# Patient Record
Sex: FEMALE | Race: BLACK OR AFRICAN AMERICAN | NOT HISPANIC OR LATINO | Employment: OTHER | URBAN - METROPOLITAN AREA
[De-identification: names, ages, dates, MRNs, and addresses within clinical notes are randomized per-mention and may not be internally consistent; named-entity substitution may affect disease eponyms.]

---

## 2021-08-02 DIAGNOSIS — E21.0 PRIMARY HYPERPARATHYROIDISM: Primary | ICD-10-CM

## 2021-08-10 ENCOUNTER — HOSPITAL ENCOUNTER (OUTPATIENT)
Dept: RADIOLOGY | Facility: HOSPITAL | Age: 76
Discharge: HOME | End: 2021-08-10
Attending: INTERNAL MEDICINE
Payer: MEDICARE

## 2021-08-10 DIAGNOSIS — E21.0 PRIMARY HYPERPARATHYROIDISM: Primary | ICD-10-CM

## 2021-08-10 DIAGNOSIS — E21.0 PRIMARY HYPERPARATHYROIDISM: ICD-10-CM

## 2021-08-10 PROCEDURE — 77080 DXA BONE DENSITY AXIAL: CPT | Mod: TC,PO

## 2021-08-12 ENCOUNTER — LAB VISIT (OUTPATIENT)
Dept: LAB | Facility: HOSPITAL | Age: 76
End: 2021-08-12
Attending: INTERNAL MEDICINE
Payer: MEDICARE

## 2021-08-12 DIAGNOSIS — E21.0 PRIMARY HYPERPARATHYROIDISM: ICD-10-CM

## 2021-08-12 LAB
CALCIUM 24H UR-MRATE: 8 MG/HR (ref 4–12)
CALCIUM UR-MCNC: 12.4 MG/DL
CALCIUM URINE (MG/SPEC): 192 MG/SPEC
CREAT 24H UR-MRATE: 33.6 MG/HR (ref 40–75)
CREAT UR-MCNC: 52 MG/DL (ref 15–325)
CREATININE, URINE (MG/SPEC): 806 MG/SPEC
URINE COLLECTION DURATION: 24 HR
URINE COLLECTION DURATION: 24 HR
URINE VOLUME: 1550 ML
URINE VOLUME: 1550 ML

## 2021-08-12 PROCEDURE — 82570 ASSAY OF URINE CREATININE: CPT | Performed by: INTERNAL MEDICINE

## 2021-08-12 PROCEDURE — 82340 ASSAY OF CALCIUM IN URINE: CPT | Performed by: INTERNAL MEDICINE

## 2021-10-08 ENCOUNTER — LAB VISIT (OUTPATIENT)
Dept: LAB | Facility: HOSPITAL | Age: 76
End: 2021-10-08
Attending: INTERNAL MEDICINE
Payer: MEDICARE

## 2021-10-08 DIAGNOSIS — E21.0 PRIMARY HYPERPARATHYROIDISM: ICD-10-CM

## 2021-10-08 DIAGNOSIS — E78.00 PURE HYPERCHOLESTEROLEMIA: ICD-10-CM

## 2021-10-08 DIAGNOSIS — Z79.899 ENCOUNTER FOR LONG-TERM (CURRENT) USE OF OTHER MEDICATIONS: Primary | ICD-10-CM

## 2021-10-08 DIAGNOSIS — E11.9 DIABETES MELLITUS WITHOUT COMPLICATION: ICD-10-CM

## 2021-10-08 DIAGNOSIS — E55.9 AVITAMINOSIS D: ICD-10-CM

## 2021-10-08 LAB
25(OH)D3+25(OH)D2 SERPL-MCNC: 30 NG/ML (ref 30–96)
ALBUMIN SERPL BCP-MCNC: 3.8 G/DL (ref 3.5–5.2)
ALP SERPL-CCNC: 94 U/L (ref 55–135)
ALT SERPL W/O P-5'-P-CCNC: 57 U/L (ref 10–44)
ANION GAP SERPL CALC-SCNC: 9 MMOL/L (ref 8–16)
ANION GAP SERPL CALC-SCNC: 9 MMOL/L (ref 8–16)
AST SERPL-CCNC: 30 U/L (ref 10–40)
BILIRUB SERPL-MCNC: 0.7 MG/DL (ref 0.1–1)
BUN SERPL-MCNC: 11 MG/DL (ref 8–23)
BUN SERPL-MCNC: 11 MG/DL (ref 8–23)
CALCIUM SERPL-MCNC: 10.2 MG/DL (ref 8.7–10.5)
CALCIUM SERPL-MCNC: 10.2 MG/DL (ref 8.7–10.5)
CHLORIDE SERPL-SCNC: 103 MMOL/L (ref 95–110)
CHLORIDE SERPL-SCNC: 103 MMOL/L (ref 95–110)
CHOLEST SERPL-MCNC: 93 MG/DL (ref 120–199)
CHOLEST/HDLC SERPL: 2.2 {RATIO} (ref 2–5)
CO2 SERPL-SCNC: 27 MMOL/L (ref 23–29)
CO2 SERPL-SCNC: 27 MMOL/L (ref 23–29)
CREAT SERPL-MCNC: 0.9 MG/DL (ref 0.5–1.4)
CREAT SERPL-MCNC: 0.9 MG/DL (ref 0.5–1.4)
EST. GFR  (AFRICAN AMERICAN): >60 ML/MIN/1.73 M^2
EST. GFR  (AFRICAN AMERICAN): >60 ML/MIN/1.73 M^2
EST. GFR  (NON AFRICAN AMERICAN): >60 ML/MIN/1.73 M^2
EST. GFR  (NON AFRICAN AMERICAN): >60 ML/MIN/1.73 M^2
ESTIMATED AVG GLUCOSE: 335 MG/DL (ref 68–131)
GLUCOSE SERPL-MCNC: 319 MG/DL (ref 70–110)
GLUCOSE SERPL-MCNC: 319 MG/DL (ref 70–110)
HBA1C MFR BLD: 13.3 % (ref 4.5–6.2)
HDLC SERPL-MCNC: 42 MG/DL (ref 40–75)
HDLC SERPL: 45.2 % (ref 20–50)
LDLC SERPL CALC-MCNC: 39.6 MG/DL (ref 63–159)
NONHDLC SERPL-MCNC: 51 MG/DL
POTASSIUM SERPL-SCNC: 3.6 MMOL/L (ref 3.5–5.1)
POTASSIUM SERPL-SCNC: 3.6 MMOL/L (ref 3.5–5.1)
PROT SERPL-MCNC: 6.1 G/DL (ref 6–8.4)
PTH-INTACT SERPL-MCNC: 99.5 PG/ML (ref 9–77)
SODIUM SERPL-SCNC: 139 MMOL/L (ref 136–145)
SODIUM SERPL-SCNC: 139 MMOL/L (ref 136–145)
T4 FREE SERPL-MCNC: 0.71 NG/DL (ref 0.71–1.51)
TRIGL SERPL-MCNC: 57 MG/DL (ref 30–150)
TSH SERPL DL<=0.005 MIU/L-ACNC: 26.72 UIU/ML (ref 0.34–5.6)

## 2021-10-08 PROCEDURE — 36415 COLL VENOUS BLD VENIPUNCTURE: CPT | Performed by: INTERNAL MEDICINE

## 2021-10-08 PROCEDURE — 80061 LIPID PANEL: CPT | Performed by: INTERNAL MEDICINE

## 2021-10-08 PROCEDURE — 84443 ASSAY THYROID STIM HORMONE: CPT | Performed by: INTERNAL MEDICINE

## 2021-10-08 PROCEDURE — 80053 COMPREHEN METABOLIC PANEL: CPT | Performed by: INTERNAL MEDICINE

## 2021-10-08 PROCEDURE — 83970 ASSAY OF PARATHORMONE: CPT | Performed by: INTERNAL MEDICINE

## 2021-10-08 PROCEDURE — 84439 ASSAY OF FREE THYROXINE: CPT | Performed by: INTERNAL MEDICINE

## 2021-10-08 PROCEDURE — 82306 VITAMIN D 25 HYDROXY: CPT | Performed by: INTERNAL MEDICINE

## 2021-10-08 PROCEDURE — 83036 HEMOGLOBIN GLYCOSYLATED A1C: CPT | Performed by: INTERNAL MEDICINE

## 2021-11-15 ENCOUNTER — HOSPITAL ENCOUNTER (OUTPATIENT)
Dept: RADIOLOGY | Facility: OTHER | Age: 76
Discharge: HOME | End: 2021-11-15
Attending: INTERNAL MEDICINE
Payer: MEDICARE

## 2021-11-15 DIAGNOSIS — R63.4 WEIGHT LOSS: ICD-10-CM

## 2021-11-15 PROCEDURE — 71046 X-RAY EXAM CHEST 2 VIEWS: CPT | Mod: TC,FY

## 2021-11-15 PROCEDURE — 76700 US ABDOMEN COMPLETE: ICD-10-PCS | Mod: 26,,, | Performed by: RADIOLOGY

## 2021-11-15 PROCEDURE — 76700 US EXAM ABDOM COMPLETE: CPT | Mod: 26,,, | Performed by: RADIOLOGY

## 2021-11-15 PROCEDURE — 71046 XR CHEST PA AND LATERAL: ICD-10-PCS | Mod: 26,,, | Performed by: RADIOLOGY

## 2021-11-15 PROCEDURE — 76700 US EXAM ABDOM COMPLETE: CPT | Mod: TC

## 2021-11-15 PROCEDURE — 71046 X-RAY EXAM CHEST 2 VIEWS: CPT | Mod: 26,,, | Performed by: RADIOLOGY

## 2022-01-07 ENCOUNTER — HOSPITAL ENCOUNTER (OUTPATIENT)
Facility: HOSPITAL | Age: 77
Discharge: HOME | End: 2022-01-09
Attending: EMERGENCY MEDICINE | Admitting: INTERNAL MEDICINE
Payer: MEDICARE

## 2022-01-07 DIAGNOSIS — K81.9 CHOLECYSTITIS: Primary | ICD-10-CM

## 2022-01-07 DIAGNOSIS — K80.20 CALCULUS OF GALLBLADDER WITHOUT CHOLECYSTITIS WITHOUT OBSTRUCTION: ICD-10-CM

## 2022-01-07 DIAGNOSIS — R07.9 CHEST PAIN: ICD-10-CM

## 2022-01-07 PROBLEM — E03.9 HYPOTHYROIDISM: Status: ACTIVE | Noted: 2022-01-07

## 2022-01-07 PROBLEM — C7A.8 PRIMARY MALIGNANT NEUROENDOCRINE NEOPLASM OF PANCREAS: Status: ACTIVE | Noted: 2021-12-20

## 2022-01-07 PROBLEM — E11.9 DIABETES MELLITUS: Status: ACTIVE | Noted: 2022-01-07

## 2022-01-07 PROBLEM — I15.2 HYPERTENSION ASSOCIATED WITH DIABETES: Status: ACTIVE | Noted: 2022-01-07

## 2022-01-07 PROBLEM — E11.59 HYPERTENSION ASSOCIATED WITH DIABETES: Status: ACTIVE | Noted: 2022-01-07

## 2022-01-07 PROBLEM — I10 HYPERTENSION: Status: ACTIVE | Noted: 2022-01-07

## 2022-01-07 PROBLEM — Z79.4 TYPE 2 DIABETES MELLITUS, WITH LONG-TERM CURRENT USE OF INSULIN: Status: ACTIVE | Noted: 2022-01-07

## 2022-01-07 PROBLEM — E78.5 HYPERLIPIDEMIA: Status: ACTIVE | Noted: 2022-01-07

## 2022-01-07 LAB
ALBUMIN SERPL BCP-MCNC: 3.6 G/DL (ref 3.5–5.2)
ALP SERPL-CCNC: 75 U/L (ref 55–135)
ALT SERPL W/O P-5'-P-CCNC: 79 U/L (ref 10–44)
ANION GAP SERPL CALC-SCNC: 8 MMOL/L (ref 8–16)
AST SERPL-CCNC: 67 U/L (ref 10–40)
BASOPHILS # BLD AUTO: 0.03 K/UL (ref 0–0.2)
BASOPHILS NFR BLD: 0.2 % (ref 0–1.9)
BILIRUB SERPL-MCNC: 0.5 MG/DL (ref 0.1–1)
BILIRUB UR QL STRIP: NEGATIVE
BUN SERPL-MCNC: 13 MG/DL (ref 8–23)
CALCIUM SERPL-MCNC: 10.4 MG/DL (ref 8.7–10.5)
CHLORIDE SERPL-SCNC: 108 MMOL/L (ref 95–110)
CLARITY UR: CLEAR
CO2 SERPL-SCNC: 21 MMOL/L (ref 23–29)
COLOR UR: YELLOW
CREAT SERPL-MCNC: 1 MG/DL (ref 0.5–1.4)
DIFFERENTIAL METHOD: ABNORMAL
EOSINOPHIL # BLD AUTO: 0 K/UL (ref 0–0.5)
EOSINOPHIL NFR BLD: 0 % (ref 0–8)
ERYTHROCYTE [DISTWIDTH] IN BLOOD BY AUTOMATED COUNT: 15.6 % (ref 11.5–14.5)
EST. GFR  (AFRICAN AMERICAN): >60 ML/MIN/1.73 M^2
EST. GFR  (NON AFRICAN AMERICAN): 56 ML/MIN/1.73 M^2
GLUCOSE SERPL-MCNC: 297 MG/DL (ref 70–110)
GLUCOSE UR QL STRIP: NEGATIVE
HCT VFR BLD AUTO: 33 % (ref 37–48.5)
HGB BLD-MCNC: 10.3 G/DL (ref 12–16)
HGB UR QL STRIP: NEGATIVE
IMM GRANULOCYTES # BLD AUTO: 0.08 K/UL (ref 0–0.04)
IMM GRANULOCYTES NFR BLD AUTO: 0.5 % (ref 0–0.5)
INFLUENZA A, MOLECULAR: NEGATIVE
INFLUENZA B, MOLECULAR: NEGATIVE
KETONES UR QL STRIP: NEGATIVE
LEUKOCYTE ESTERASE UR QL STRIP: NEGATIVE
LIPASE SERPL-CCNC: 8 U/L (ref 4–60)
LYMPHOCYTES # BLD AUTO: 0.4 K/UL (ref 1–4.8)
LYMPHOCYTES NFR BLD: 2.6 % (ref 18–48)
MCH RBC QN AUTO: 26.5 PG (ref 27–31)
MCHC RBC AUTO-ENTMCNC: 31.2 G/DL (ref 32–36)
MCV RBC AUTO: 85 FL (ref 82–98)
MONOCYTES # BLD AUTO: 0.6 K/UL (ref 0.3–1)
MONOCYTES NFR BLD: 3.5 % (ref 4–15)
NEUTROPHILS # BLD AUTO: 15.8 K/UL (ref 1.8–7.7)
NEUTROPHILS NFR BLD: 93.2 % (ref 38–73)
NITRITE UR QL STRIP: NEGATIVE
NRBC BLD-RTO: 0 /100 WBC
PH UR STRIP: 6 [PH] (ref 5–8)
PLATELET # BLD AUTO: 327 K/UL (ref 150–450)
PMV BLD AUTO: 10 FL (ref 9.2–12.9)
POTASSIUM SERPL-SCNC: 3.9 MMOL/L (ref 3.5–5.1)
PROT SERPL-MCNC: 6.1 G/DL (ref 6–8.4)
PROT UR QL STRIP: NEGATIVE
RBC # BLD AUTO: 3.88 M/UL (ref 4–5.4)
SARS-COV-2 RDRP RESP QL NAA+PROBE: NEGATIVE
SODIUM SERPL-SCNC: 137 MMOL/L (ref 136–145)
SP GR UR STRIP: <=1.005 (ref 1–1.03)
SPECIMEN SOURCE: NORMAL
URN SPEC COLLECT METH UR: ABNORMAL
UROBILINOGEN UR STRIP-ACNC: NEGATIVE EU/DL
WBC # BLD AUTO: 16.9 K/UL (ref 3.9–12.7)

## 2022-01-07 PROCEDURE — 36415 COLL VENOUS BLD VENIPUNCTURE: CPT | Performed by: EMERGENCY MEDICINE

## 2022-01-07 PROCEDURE — 85025 COMPLETE CBC W/AUTO DIFF WBC: CPT | Performed by: EMERGENCY MEDICINE

## 2022-01-07 PROCEDURE — 63600175 PHARM REV CODE 636 W HCPCS: Performed by: EMERGENCY MEDICINE

## 2022-01-07 PROCEDURE — G0378 HOSPITAL OBSERVATION PER HR: HCPCS

## 2022-01-07 PROCEDURE — 25500020 PHARM REV CODE 255

## 2022-01-07 PROCEDURE — 99285 EMERGENCY DEPT VISIT HI MDM: CPT | Mod: 25,CS

## 2022-01-07 PROCEDURE — 96365 THER/PROPH/DIAG IV INF INIT: CPT

## 2022-01-07 PROCEDURE — 83690 ASSAY OF LIPASE: CPT | Performed by: EMERGENCY MEDICINE

## 2022-01-07 PROCEDURE — U0002 COVID-19 LAB TEST NON-CDC: HCPCS | Performed by: EMERGENCY MEDICINE

## 2022-01-07 PROCEDURE — 80053 COMPREHEN METABOLIC PANEL: CPT | Performed by: EMERGENCY MEDICINE

## 2022-01-07 PROCEDURE — 81003 URINALYSIS AUTO W/O SCOPE: CPT | Performed by: EMERGENCY MEDICINE

## 2022-01-07 PROCEDURE — 87502 INFLUENZA DNA AMP PROBE: CPT | Performed by: EMERGENCY MEDICINE

## 2022-01-07 RX ORDER — VITAMIN E 268 MG
400 CAPSULE ORAL DAILY
COMMUNITY

## 2022-01-07 RX ORDER — LISINOPRIL 10 MG/1
10 TABLET ORAL DAILY
COMMUNITY

## 2022-01-07 RX ORDER — ASPIRIN 81 MG/1
81 TABLET ORAL DAILY
COMMUNITY

## 2022-01-07 RX ORDER — INSULIN GLARGINE 300 U/ML
INJECTION, SOLUTION SUBCUTANEOUS
COMMUNITY
Start: 2021-12-21

## 2022-01-07 RX ORDER — METOPROLOL SUCCINATE 50 MG/1
50 TABLET, EXTENDED RELEASE ORAL DAILY
Status: ON HOLD | COMMUNITY
End: 2022-01-09 | Stop reason: SDUPTHER

## 2022-01-07 RX ORDER — PANTOPRAZOLE SODIUM 40 MG/1
40 TABLET, DELAYED RELEASE ORAL DAILY
COMMUNITY

## 2022-01-07 RX ORDER — LEVOTHYROXINE SODIUM 88 UG/1
88 TABLET ORAL
Status: ON HOLD | COMMUNITY
End: 2022-01-09 | Stop reason: SDUPTHER

## 2022-01-07 RX ORDER — ATORVASTATIN CALCIUM 20 MG/1
20 TABLET, FILM COATED ORAL DAILY
COMMUNITY

## 2022-01-07 RX ORDER — METFORMIN HYDROCHLORIDE 500 MG/1
500 TABLET ORAL 2 TIMES DAILY WITH MEALS
COMMUNITY

## 2022-01-07 RX ADMIN — PIPERACILLIN AND TAZOBACTAM 4.5 G: 4; .5 INJECTION, POWDER, LYOPHILIZED, FOR SOLUTION INTRAVENOUS; PARENTERAL at 11:01

## 2022-01-07 RX ADMIN — IOHEXOL 75 ML: 350 INJECTION, SOLUTION INTRAVENOUS at 09:01

## 2022-01-08 LAB
POCT GLUCOSE: 166 MG/DL (ref 70–110)
POCT GLUCOSE: 177 MG/DL (ref 70–110)
POCT GLUCOSE: 99 MG/DL (ref 70–110)

## 2022-01-08 PROCEDURE — 99203 PR OFFICE/OUTPT VISIT, NEW, LEVL III, 30-44 MIN: ICD-10-PCS | Mod: ,,, | Performed by: SURGERY

## 2022-01-08 PROCEDURE — G0378 HOSPITAL OBSERVATION PER HR: HCPCS

## 2022-01-08 PROCEDURE — 96366 THER/PROPH/DIAG IV INF ADDON: CPT

## 2022-01-08 PROCEDURE — 94761 N-INVAS EAR/PLS OXIMETRY MLT: CPT

## 2022-01-08 PROCEDURE — 25000003 PHARM REV CODE 250: Performed by: NURSE PRACTITIONER

## 2022-01-08 PROCEDURE — 63600175 PHARM REV CODE 636 W HCPCS: Performed by: NURSE PRACTITIONER

## 2022-01-08 PROCEDURE — 99203 OFFICE O/P NEW LOW 30 MIN: CPT | Mod: ,,, | Performed by: SURGERY

## 2022-01-08 RX ORDER — SODIUM,POTASSIUM PHOSPHATES 280-250MG
2 POWDER IN PACKET (EA) ORAL
Status: DISCONTINUED | OUTPATIENT
Start: 2022-01-08 | End: 2022-01-09 | Stop reason: HOSPADM

## 2022-01-08 RX ORDER — INSULIN ASPART 100 [IU]/ML
1-10 INJECTION, SOLUTION INTRAVENOUS; SUBCUTANEOUS
Status: DISCONTINUED | OUTPATIENT
Start: 2022-01-08 | End: 2022-01-09 | Stop reason: HOSPADM

## 2022-01-08 RX ORDER — LANOLIN ALCOHOL/MO/W.PET/CERES
800 CREAM (GRAM) TOPICAL
Status: DISCONTINUED | OUTPATIENT
Start: 2022-01-08 | End: 2022-01-09 | Stop reason: HOSPADM

## 2022-01-08 RX ORDER — SIMETHICONE 80 MG
1 TABLET,CHEWABLE ORAL 4 TIMES DAILY PRN
Status: DISCONTINUED | OUTPATIENT
Start: 2022-01-08 | End: 2022-01-09 | Stop reason: HOSPADM

## 2022-01-08 RX ORDER — PROCHLORPERAZINE EDISYLATE 5 MG/ML
5 INJECTION INTRAMUSCULAR; INTRAVENOUS EVERY 6 HOURS PRN
Status: DISCONTINUED | OUTPATIENT
Start: 2022-01-08 | End: 2022-01-09 | Stop reason: HOSPADM

## 2022-01-08 RX ORDER — AMOXICILLIN 250 MG
1 CAPSULE ORAL 2 TIMES DAILY
Status: DISCONTINUED | OUTPATIENT
Start: 2022-01-08 | End: 2022-01-09 | Stop reason: HOSPADM

## 2022-01-08 RX ORDER — IBUPROFEN 200 MG
24 TABLET ORAL
Status: DISCONTINUED | OUTPATIENT
Start: 2022-01-08 | End: 2022-01-09 | Stop reason: HOSPADM

## 2022-01-08 RX ORDER — LISINOPRIL 10 MG/1
10 TABLET ORAL DAILY
Status: DISCONTINUED | OUTPATIENT
Start: 2022-01-08 | End: 2022-01-09 | Stop reason: HOSPADM

## 2022-01-08 RX ORDER — TALC
6 POWDER (GRAM) TOPICAL NIGHTLY PRN
Status: DISCONTINUED | OUTPATIENT
Start: 2022-01-08 | End: 2022-01-09 | Stop reason: HOSPADM

## 2022-01-08 RX ORDER — MAG HYDROX/ALUMINUM HYD/SIMETH 200-200-20
30 SUSPENSION, ORAL (FINAL DOSE FORM) ORAL 4 TIMES DAILY PRN
Status: DISCONTINUED | OUTPATIENT
Start: 2022-01-08 | End: 2022-01-09 | Stop reason: HOSPADM

## 2022-01-08 RX ORDER — ATORVASTATIN CALCIUM 20 MG/1
20 TABLET, FILM COATED ORAL DAILY
Status: DISCONTINUED | OUTPATIENT
Start: 2022-01-08 | End: 2022-01-09 | Stop reason: HOSPADM

## 2022-01-08 RX ORDER — HYDROCODONE BITARTRATE AND ACETAMINOPHEN 10; 325 MG/1; MG/1
1 TABLET ORAL EVERY 6 HOURS PRN
Status: DISCONTINUED | OUTPATIENT
Start: 2022-01-08 | End: 2022-01-09 | Stop reason: HOSPADM

## 2022-01-08 RX ORDER — HYDROCODONE BITARTRATE AND ACETAMINOPHEN 5; 325 MG/1; MG/1
1 TABLET ORAL EVERY 6 HOURS PRN
Status: DISCONTINUED | OUTPATIENT
Start: 2022-01-08 | End: 2022-01-09 | Stop reason: HOSPADM

## 2022-01-08 RX ORDER — ACETAMINOPHEN 325 MG/1
650 TABLET ORAL EVERY 8 HOURS PRN
Status: DISCONTINUED | OUTPATIENT
Start: 2022-01-08 | End: 2022-01-09 | Stop reason: HOSPADM

## 2022-01-08 RX ORDER — IBUPROFEN 200 MG
16 TABLET ORAL
Status: DISCONTINUED | OUTPATIENT
Start: 2022-01-08 | End: 2022-01-09 | Stop reason: HOSPADM

## 2022-01-08 RX ORDER — ONDANSETRON 2 MG/ML
4 INJECTION INTRAMUSCULAR; INTRAVENOUS EVERY 6 HOURS PRN
Status: DISCONTINUED | OUTPATIENT
Start: 2022-01-08 | End: 2022-01-09 | Stop reason: HOSPADM

## 2022-01-08 RX ORDER — NALOXONE HCL 0.4 MG/ML
0.02 VIAL (ML) INJECTION
Status: DISCONTINUED | OUTPATIENT
Start: 2022-01-08 | End: 2022-01-09 | Stop reason: HOSPADM

## 2022-01-08 RX ORDER — SODIUM CHLORIDE 0.9 % (FLUSH) 0.9 %
10 SYRINGE (ML) INJECTION EVERY 8 HOURS PRN
Status: DISCONTINUED | OUTPATIENT
Start: 2022-01-08 | End: 2022-01-09 | Stop reason: HOSPADM

## 2022-01-08 RX ORDER — GLUCAGON 1 MG
1 KIT INJECTION
Status: DISCONTINUED | OUTPATIENT
Start: 2022-01-08 | End: 2022-01-09 | Stop reason: HOSPADM

## 2022-01-08 RX ORDER — METOPROLOL SUCCINATE 50 MG/1
50 TABLET, EXTENDED RELEASE ORAL DAILY
Status: DISCONTINUED | OUTPATIENT
Start: 2022-01-08 | End: 2022-01-09

## 2022-01-08 RX ADMIN — PIPERACILLIN AND TAZOBACTAM 4.5 G: 4; .5 INJECTION, POWDER, LYOPHILIZED, FOR SOLUTION INTRAVENOUS; PARENTERAL at 09:01

## 2022-01-08 RX ADMIN — HYDROCODONE BITARTRATE AND ACETAMINOPHEN 1 TABLET: 10; 325 TABLET ORAL at 12:01

## 2022-01-08 RX ADMIN — HYDROCODONE BITARTRATE AND ACETAMINOPHEN 1 TABLET: 10; 325 TABLET ORAL at 09:01

## 2022-01-08 RX ADMIN — MELATONIN TAB 3 MG 6 MG: 3 TAB at 12:01

## 2022-01-08 RX ADMIN — MELATONIN TAB 3 MG 6 MG: 3 TAB at 09:01

## 2022-01-08 RX ADMIN — METOPROLOL SUCCINATE 50 MG: 50 TABLET, EXTENDED RELEASE ORAL at 09:01

## 2022-01-08 RX ADMIN — LISINOPRIL 10 MG: 10 TABLET ORAL at 09:01

## 2022-01-08 NOTE — CONSULTS
[facility]  General Surgery  Consult Note    Patient Name: Asha Lam  MRN: [id number]  Code Status: Full Code  Admission Date: 1/7/2022  Hospital Length of Stay: 0 days  Attending Physician: Abimael Sanchez MD  Primary Care Provider: Ruben Brown MD    Patient information was obtained from patient and ER records.     Inpatient consult to General Surgery  Consult performed by: Fercho Guo MD  Consult ordered by: Britney Donis NP        Subjective:     Principal Problem: Cholelithiasis    History of Present Illness: 76-year-old female who presented to the ER yesterday evening with abdominal pain located in the right upper quadrant.  Initially per the ER physician pain was felt to be in the right upper quadrant radiating around to her right flank.  On CT scan she did have cholelithiasis which was also confirmed on ultrasound.  There was concern for cholecystitis.  Ultrasound did confirm cholelithiasis but no other findings consistent with cholecystitis.  Given concern for cholecystitis patient was admitted with initial plans for surgical intervention.  Patient notes this morning that she is having no pain.  Upon further discussion with the patient she notes she has been having chronic pain in the right upper quadrant for the last several months.  She notes this began several months ago and has been fairly constant.  She believes this is related to a neuroendocrine tumor in her pancreas.  This is currently being worked up at another facility.  Reportedly there are plans for surgical resection in the near future.  Today patient notes her pain is much improved and states that she would prefer to not have surgery at the present time.  Patient has no significant past surgical history.  Her past medical history significant for hypertension, hyperlipidemia and a neuroendocrine tumor of the pancreas.      No current facility-administered medications on file prior to encounter.     Current  Outpatient Medications on File Prior to Encounter   Medication Sig    aspirin (ECOTRIN) 81 MG EC tablet Take 81 mg by mouth once daily.    atorvastatin (LIPITOR) 20 MG tablet Take 20 mg by mouth once daily.    levothyroxine (SYNTHROID) 88 MCG tablet Take 88 mcg by mouth before breakfast.    lisinopriL 10 MG tablet Take 10 mg by mouth once daily.    metFORMIN (GLUCOPHAGE) 500 MG tablet Take 500 mg by mouth 2 (two) times daily with meals.    metoprolol succinate (TOPROL-XL) 50 MG 24 hr tablet Take 50 mg by mouth once daily.    pantoprazole (PROTONIX) 40 MG tablet Take 40 mg by mouth once daily.    TOUJEO SOLOSTAR U-300 INSULIN 300 unit/mL (1.5 mL) InPn pen SMARTSIG:10 Unit(s) SUB-Q Daily    vitamin E 400 UNIT capsule Take 400 Units by mouth once daily.    [DISCONTINUED] SENSIPAR 30 mg Tab TAKE 1 TABLET BY MOUTH ONCE DAILY AS DIRECTED       Review of patient's allergies indicates:  No Known Allergies    Past Medical History:   Diagnosis Date    Diabetes mellitus     Hypertension     Hyperthyroidism     Hypothyroid      Past Surgical History:   Procedure Laterality Date    ANKLE SURGERY Left      SECTION      HYSTERECTOMY       Family History     Family history is unknown by patient.        Tobacco Use    Smoking status: Never Smoker    Smokeless tobacco: Never Used   Substance and Sexual Activity    Alcohol use: Not Currently    Drug use: Never    Sexual activity: Not on file     Review of Systems   Constitutional: Negative for activity change and appetite change.   Gastrointestinal: Positive for abdominal pain and diarrhea.   Musculoskeletal: Negative for arthralgias and joint swelling.     Objective:     Vital Signs (Most Recent):  Temp: 99.9 °F (37.7 °C) (22 1602)  Pulse: 67 (22 1602)  Resp: 16 (22 1602)  BP: 135/60 (22 1602)  SpO2: 97 % (22 1602) Vital Signs (24h Range):  Temp:  [96.9 °F (36.1 °C)-99.9 °F (37.7 °C)] 99.9 °F (37.7 °C)  Pulse:  [57-94]  67  Resp:  [16-18] 16  SpO2:  [97 %-100 %] 97 %  BP: (118-146)/(56-70) 135/60     Weight: 59.5 kg (131 lb 2.8 oz)  Body mass index is 23.24 kg/m².    Physical Exam  Vitals reviewed.   Cardiovascular:      Rate and Rhythm: Normal rate.      Pulses: Normal pulses.      Heart sounds: No murmur heard.      Pulmonary:      Effort: Pulmonary effort is normal. No respiratory distress.   Abdominal:      General: Abdomen is flat. Bowel sounds are normal. There is no distension.      Tenderness: There is no abdominal tenderness.   Neurological:      Mental Status: She is alert.         Significant Labs:  I have reviewed all pertinent lab results within the past 24 hours.  CBC:   Recent Labs   Lab 01/07/22 2050   WBC 16.90*   RBC 3.88*   HGB 10.3*   HCT 33.0*      MCV 85   MCH 26.5*   MCHC 31.2*     BMP:   Recent Labs   Lab 01/07/22 2050   *      K 3.9      CO2 21*   BUN 13   CREATININE 1.0   CALCIUM 10.4     CMP:   Recent Labs   Lab 01/07/22 2050   *   CALCIUM 10.4   ALBUMIN 3.6   PROT 6.1      K 3.9   CO2 21*      BUN 13   CREATININE 1.0   ALKPHOS 75   ALT 79*   AST 67*   BILITOT 0.5       Significant Diagnostics:  CT and US reviewed.  Cholelithaisis noted.  Mass at head of pancreas    Assessment/Plan:     * Cholelithiasis  Discussed with patient and her daughter.  No acute findings consistent with acute cholecystitis.  For patient pain is been present for several months.  Agree with patient no indication for emergent surgical cholecystectomy.  Discussed with patient that the gallbladder would likely be removed anyway since the time of her surgery.  Patient expresses understanding.  Will advance her diet and surgery to sign off.      VTE Risk Mitigation (From admission, onward)         Ordered     Reason for No Pharmacological VTE Prophylaxis  Once        Question:  Reasons:  Answer:  Risk of Bleeding    01/08/22 0005     IP VTE HIGH RISK PATIENT  Once         01/08/22 0004      Place sequential compression device  Until discontinued         01/08/22 0004                Thank you for your consult. I will sign off. Please contact us if you have any additional questions.    Fercho Guo MD  General Surgery  [facility]

## 2022-01-08 NOTE — ASSESSMENT & PLAN NOTE
Recently diagnosed with a neuroendocrine neoplasm of pancreas & receives care at [facility].    Plan:  Continue to monitor for acute changes  PRN pain control

## 2022-01-08 NOTE — PROGRESS NOTES
[facility] Hospital Medicine  Progress Note    Patient Name: Asha Lam  MRN: [id number]  Patient Class: OP- Observation   Admission Date: 1/7/2022  Length of Stay: 0 days  Attending Physician: Abimael Sanchez MD  Primary Care Provider: Ruben Brown MD    Subjective:     Principal Problem:Cholelithiasis    HPI:  Asha Lam is a 77 y/o female with PMHx of HTN, DM type II, Hypothyroidism,  and hyperlipidemia who presents with diarrhea and RUQ pain since this morning. Patient has had one episode of diarrhea this morning. Patient recently diagnosed with a neuroendocrine neoplasm of pancreas and is seen at [facility]. Patient was brought to ED by her daughter for further evaluation of RUQ pain. Daughter reports low grade fever to 99.8F that resolved with Tylenol.  Abdominal CT showed cholelithiasis. Question is made of strandy changes in the pericholecystic fat raising question of cholecystitis and Abdominal US cholelithiasis. No convincing evidence of cholecystitis. Stable left lobe of the liver lesion as compared to prior ultrasound and CT suspicious for neoplasm. Lab work revealed leukocytosis to 16.9.  ED provider spoke to Dr. Guo and patient started on IV Zosyn. Hospital medicine consulted and patient was seen in ED with her daughter, Felicity, at bedside.  Patient complaining of RUQ pain that increases with inspiration. Patient will be admitted to hospital medicine for further evaluation of her cholelithiasis.       Overview/Hospital Course:  No notes on file    Interval History: Patient with no acute events overnight. Continues to decline surgery if needed per nursing notes. Currently on liquid diet. Surgery to evaluate later today.     Review of Systems   Constitutional: Positive for appetite change. Negative for activity change, chills and diaphoresis.   HENT: Negative for congestion, dental problem and drooling.    Eyes: Negative for pain, discharge and itching.   Respiratory:  Negative for apnea, choking and chest tightness.    Cardiovascular: Negative for chest pain, palpitations and leg swelling.   Gastrointestinal: Positive for abdominal pain and diarrhea. Negative for abdominal distention and blood in stool.   Endocrine: Negative for cold intolerance, heat intolerance, polydipsia and polyphagia.   Genitourinary: Negative for difficulty urinating, dyspareunia and dysuria.   Musculoskeletal: Negative for arthralgias, back pain and gait problem.   All other systems reviewed and are negative.    Objective:     Vital Signs (Most Recent):  Temp: 97 °F (36.1 °C) (01/08/22 0748)  Pulse: (!) 57 (01/08/22 0748)  Resp: 18 (01/08/22 0748)  BP: 128/70 (01/08/22 0748)  SpO2: 100 % (01/08/22 0748) Vital Signs (24h Range):  Temp:  [96.9 °F (36.1 °C)-97.9 °F (36.6 °C)] 97 °F (36.1 °C)  Pulse:  [57-94] 57  Resp:  [17-18] 18  SpO2:  [98 %-100 %] 100 %  BP: (118-146)/(56-70) 128/70     Weight: 59.5 kg (131 lb 2.8 oz)  Body mass index is 23.24 kg/m².    Intake/Output Summary (Last 24 hours) at 1/8/2022 1119  Last data filed at 1/8/2022 0030  Gross per 24 hour   Intake 100 ml   Output --   Net 100 ml      Physical Exam  Vitals and nursing note reviewed.   Constitutional:       Appearance: Normal appearance. She is normal weight.   HENT:      Head: Normocephalic and atraumatic.      Nose: Nose normal.      Mouth/Throat:      Mouth: Mucous membranes are moist.   Eyes:      Extraocular Movements: Extraocular movements intact.      Conjunctiva/sclera: Conjunctivae normal.      Pupils: Pupils are equal, round, and reactive to light.   Cardiovascular:      Rate and Rhythm: Normal rate and regular rhythm.      Pulses: Normal pulses.      Heart sounds: Normal heart sounds.   Pulmonary:      Effort: Pulmonary effort is normal.      Breath sounds: Normal breath sounds.   Abdominal:      General: Abdomen is flat. Bowel sounds are normal.      Palpations: Abdomen is soft.   Musculoskeletal:         General: Normal  range of motion.   Skin:     General: Skin is warm and dry.   Neurological:      General: No focal deficit present.      Mental Status: She is alert and oriented to person, place, and time. Mental status is at baseline.   Psychiatric:         Mood and Affect: Mood normal.         Behavior: Behavior normal.         Significant Labs: All pertinent labs within the past 24 hours have been reviewed.    Significant Imaging: I have reviewed all pertinent imaging results/findings within the past 24 hours.      Assessment/Plan:      * Cholelithiasis  Abdominal US- Cholelithiasis. No convincing evidence of cholecystitis. Stable left lobe of the liver lesion as compared to prior ultrasound and CT suspicious for neoplasm   CT-Cholelithiasis.  Question is made of strandy changes in the pericholecystic fat raising question of cholecystitis.    Plan:  General surgery consulted  Continue IV antibiotics  PRN pain control    Primary malignant neuroendocrine neoplasm of pancreas  Recently diagnosed with a neuroendocrine neoplasm of pancreas & receives care at [facility].    Plan:  Continue to monitor for acute changes  PRN pain control    Hypertension associated with diabetes  Chronic, controlled.  Latest blood pressure and vitals reviewed-   Temp:  [96.9 °F (36.1 °C)-97.9 °F (36.6 °C)]   Pulse:  [57-94]   Resp:  [17-18]   BP: (118-146)/(56-70)   SpO2:  [98 %-100 %] .   Home meds for hypertension were reviewed and noted below.   Hypertension Medications             lisinopriL 10 MG tablet Take 10 mg by mouth once daily.    metoprolol succinate (TOPROL-XL) 50 MG 24 hr tablet Take 50 mg by mouth once daily.          While in the hospital, will manage blood pressure as follows; Continue home antihypertensive regimen    Will utilize p.r.n. blood pressure medication only if patient's blood pressure greater than  180/110 and she develops symptoms such as worsening chest pain or shortness of breath.        Hyperlipidemia   Patient is  chronically on statin.will continue for now. Monitor clinically. Last LDL was   Lab Results   Component Value Date    LDLCALC 39.6 (L) 10/08/2021            Type 2 diabetes mellitus, with long-term current use of insulin  Patient's FSGs are uncontrolled due to hyperglycemia on current medication regimen.  Last A1c reviewed-   Lab Results   Component Value Date    HGBA1C 13.3 (H) 10/08/2021     Most recent fingerstick glucose reviewed-   Recent Labs   Lab 01/08/22  0623 01/08/22  1111   POCTGLUCOSE 166* 99     Current correctional scale  Medium  Maintain anti-hyperglycemic dose as follows-   Antihyperglycemics (From admission, onward)            Start     Stop Route Frequency Ordered    01/08/22 0100  insulin aspart U-100 pen 1-10 Units         -- SubQ Before meals & nightly PRN 01/08/22 0005        Hold Oral hypoglycemics while patient is in the hospital.        Hypothyroidism  Patient has chronic hypothyroidism. TFTs reviewed-   Lab Results   Component Value Date    TSH 26.720 (H) 10/08/2021   Will continue chronic levothyroxine and adjust for and clinical changes.  Repeat TSH ordered.       VTE Risk Mitigation (From admission, onward)         Ordered     Reason for No Pharmacological VTE Prophylaxis  Once        Question:  Reasons:  Answer:  Risk of Bleeding    01/08/22 0005     IP VTE HIGH RISK PATIENT  Once         01/08/22 0004     Place sequential compression device  Until discontinued         01/08/22 0004                Discharge Planning   LORRIE:      Code Status: Full Code   Is the patient medically ready for discharge?:     Reason for patient still in hospital (select all that apply): Patient trending condition           Abimael Sanchez MD  Department of Hospital Medicine   [facility]

## 2022-01-08 NOTE — ED NOTES
Patient resting in bed at this time. Warm blankets provided. Patient denies any other needs. vss/nadn

## 2022-01-08 NOTE — H&P
[facility] Emergency [place]  [facility] Medicine  History & Physical    Patient Name: Asha Lam  MRN: [id number]  Patient Class: OP- Observation  Admission Date: 2022  Attending Physician: Abimael Sanchez MD   Primary Care Provider: Ruben Brown MD         Patient information was obtained from patient, relative(s), past medical records and ER records.     Subjective:     Principal Problem:Cholelithiasis    Chief Complaint:   Chief Complaint   Patient presents with    Diarrhea     With chills and dizziness         HPI: Asha Lam is a 75 y/o female with PMHx of HTN, DM type II, Hypothyroidism,  and hyperlipidemia who presents with diarrhea and RUQ pain since this morning. Patient has had one episode of diarrhea this morning. Patient recently diagnosed with a neuroendocrine neoplasm of pancreas and is seen at [facility]. Patient was brought to ED by her daughter for further evaluation of RUQ pain. Daughter reports low grade fever to 99.8F that resolved with Tylenol.  Abdominal CT showed cholelithiasis. Question is made of strandy changes in the pericholecystic fat raising question of cholecystitis and Abdominal US cholelithiasis. No convincing evidence of cholecystitis. Stable left lobe of the liver lesion as compared to prior ultrasound and CT suspicious for neoplasm. Lab work revealed leukocytosis to 16.9.  ED provider spoke to Dr. Guo and patient started on IV Zosyn. Hospital medicine consulted and patient was seen in ED with her daughter, Felicity, at bedside.  Patient complaining of RUQ pain that increases with inspiration. Patient will be admitted to hospital medicine for further evaluation of her cholelithiasis.       Past Medical History:   Diagnosis Date    Diabetes mellitus     Hypertension     Hyperthyroidism     Hypothyroid        Past Surgical History:   Procedure Laterality Date    ANKLE SURGERY Left      SECTION      HYSTERECTOMY         Review of patient's allergies  indicates:  No Known Allergies    No current facility-administered medications on file prior to encounter.     Current Outpatient Medications on File Prior to Encounter   Medication Sig    aspirin (ECOTRIN) 81 MG EC tablet Take 81 mg by mouth once daily.    atorvastatin (LIPITOR) 20 MG tablet Take 20 mg by mouth once daily.    levothyroxine (SYNTHROID) 88 MCG tablet Take 88 mcg by mouth before breakfast.    lisinopriL 10 MG tablet Take 10 mg by mouth once daily.    metFORMIN (GLUCOPHAGE) 500 MG tablet Take 500 mg by mouth 2 (two) times daily with meals.    metoprolol succinate (TOPROL-XL) 50 MG 24 hr tablet Take 50 mg by mouth once daily.    pantoprazole (PROTONIX) 40 MG tablet Take 40 mg by mouth once daily.    TOUJEO SOLOSTAR U-300 INSULIN 300 unit/mL (1.5 mL) InPn pen SMARTSIG:10 Unit(s) SUB-Q Daily    vitamin E 400 UNIT capsule Take 400 Units by mouth once daily.    [DISCONTINUED] SENSIPAR 30 mg Tab TAKE 1 TABLET BY MOUTH ONCE DAILY AS DIRECTED     Family History    None       Tobacco Use    Smoking status: Never Smoker    Smokeless tobacco: Never Used   Substance and Sexual Activity    Alcohol use: Not on file    Drug use: Never    Sexual activity: Not on file     Review of Systems   Constitutional: Positive for appetite change, fever and unexpected weight change. Negative for activity change and chills.   HENT: Negative for congestion, sore throat and trouble swallowing.    Eyes: Negative for photophobia and visual disturbance.   Respiratory: Negative for cough, chest tightness and shortness of breath.    Cardiovascular: Negative for chest pain, palpitations and leg swelling.   Gastrointestinal: Positive for abdominal pain and diarrhea. Negative for nausea.   Genitourinary: Negative for dysuria, flank pain and hematuria.   Musculoskeletal: Negative for back pain.   Neurological: Negative for dizziness, weakness and headaches.   Psychiatric/Behavioral: Negative for confusion.     Objective:      Vital Signs (Most Recent):  Temp: 97.9 °F (36.6 °C) (01/07/22 1737)  Pulse: 76 (01/07/22 2302)  Resp: 18 (01/07/22 2302)  BP: (!) 146/65 (01/07/22 2302)  SpO2: 98 % (01/07/22 2302) Vital Signs (24h Range):  Temp:  [97.9 °F (36.6 °C)] 97.9 °F (36.6 °C)  Pulse:  [76-94] 76  Resp:  [18] 18  SpO2:  [98 %-100 %] 98 %  BP: (118-146)/(56-65) 146/65     Weight: 55.3 kg (122 lb)  Body mass index is 21.61 kg/m².    Physical Exam  Vitals reviewed.   Constitutional:       Appearance: Normal appearance. She is normal weight.   HENT:      Head: Normocephalic.      Mouth/Throat:      Mouth: Mucous membranes are moist.      Pharynx: Oropharynx is clear.   Eyes:      Pupils: Pupils are equal, round, and reactive to light.   Cardiovascular:      Rate and Rhythm: Normal rate and regular rhythm.      Pulses: Normal pulses.      Heart sounds: Normal heart sounds.   Pulmonary:      Effort: Pulmonary effort is normal.      Breath sounds: Normal breath sounds.   Abdominal:      General: Bowel sounds are normal.      Tenderness: There is abdominal tenderness in the right upper quadrant.   Musculoskeletal:         General: Normal range of motion.      Cervical back: Normal range of motion.   Skin:     General: Skin is warm and dry.   Neurological:      Mental Status: She is alert and oriented to person, place, and time. Mental status is at baseline.   Psychiatric:         Mood and Affect: Mood normal.           CRANIAL NERVES     CN III, IV, VI   Pupils are equal, round, and reactive to light.       Significant Labs:   All pertinent labs within the past 24 hours have been reviewed.  CBC:   Recent Labs   Lab 01/07/22 2050   WBC 16.90*   HGB 10.3*   HCT 33.0*        CMP:   Recent Labs   Lab 01/07/22 2050      K 3.9      CO2 21*   *   BUN 13   CREATININE 1.0   CALCIUM 10.4   PROT 6.1   ALBUMIN 3.6   BILITOT 0.5   ALKPHOS 75   AST 67*   ALT 79*   ANIONGAP 8   EGFRNONAA 56*       Significant Imaging: I have  reviewed all pertinent imaging results/findings within the past 24 hours.     Imaging Results          US Abdomen Limited (Final result)  Result time 01/07/22 23:01:49    Final result by Jacey Wilson MD (01/07/22 23:01:49)                 Impression:      Cholelithiasis.  No convincing evidence of cholecystitis.    Stable left lobe of the liver lesion as compared to prior ultrasound and CT suspicious for neoplasm.  The right lobe of the liver lesion described on CT is not demonstrated on this exam.    Nonvisualization of the pancreas secondary to bowel gas.      Electronically signed by: Jacey Wilson  Date:    01/07/2022  Time:    23:01             Narrative:    EXAMINATION:  US ABDOMEN LIMITED    CLINICAL HISTORY:  cholecystitis;    TECHNIQUE:  Limited ultrasound of the right upper quadrant of the abdomen (including pancreas, liver, gallbladder, common bile duct, and spleen) was performed.    COMPARISON:  CT abdomen pelvis also obtained same date 01/07/2022, right upper quadrant ultrasound 11/15/2021.    FINDINGS:  Liver: Normal in size, measuring 13.4 cm. There is a stable hypoechoic 2.5 x 1.5 x 2.5 cm lesion left lobe of the liver.  No other focal lesions are identified as imaged.    Gallbladder: There is cholelithiasis.  No gallbladder wall thickening or pericholecystic fluid to suggest cholecystitis.  There was a no sonographic Martino sign.    Pancreas is obscured by gas.    Biliary system: The common duct is not dilated, measuring 4 mm.  No intrahepatic ductal dilatation.    Spleen: Normal in size and echotexture, measuring 6.9 x 3.1 cm.    Miscellaneous: No upper abdominal ascites.                                CT Abdomen Pelvis With Contrast (Final result)  Result time 01/07/22 21:55:46    Final result by Jacey Wilson MD (01/07/22 21:55:46)                 Impression:      Cholelithiasis.  Question is made of strandy changes in the pericholecystic fat raising question of  cholecystitis.    Mass replacing the pancreatic head with atrophy of the pancreatic body and tail and dilatation of the common bile duct compatible neoplasm.    Two nonspecific lesions in the liver as described stable since prior  ultrasound 11/15/2021.  Correlate with workup for metastatic disease.  Minor prominence of the central intrahepatic biliary ducts.    Left renal cysts.    This report was flagged in Epic as abnormal.      Electronically signed by: Jacey Wilson  Date:    01/07/2022  Time:    21:55             Narrative:    EXAMINATION:  CT ABDOMEN PELVIS WITH CONTRAST    CLINICAL HISTORY:  Abdominal pain, acute, nonlocalized;Neuroendocrine tumor w abdominal pain;    TECHNIQUE:  Low dose axial images, sagittal and coronal reformations were obtained from the lung bases to the pubic symphysis before and following the IV administration of 75 of mL of Omnipaque 350 and the oral administration of 30 mL of Omnipaque 350..    COMPARISON:  None    FINDINGS:  Abdomen:    - Lung bases: There is a right-sided pleural effusion with borderline simple Hounsfield unit measurements.  There is compressive atelectasis of the right lung base otherwise lung bases are clear.  Heart is not significantly enlarged.    - Liver: There is hypoechoic mass measuring 2 cm in the left lobe.  There is also heterogeneous area with margins not as well-defined in the right lobe measuring 3 cm maximally corresponding to ultrasound findings.  Mild dilatation of the central hepatic biliary ducts..  There is no hepatomegaly    - Gallbladder: Note is made of gallstones in the gallbladder.  Subtle strandy changes around the gallbladder raise a question of inflammation.    -pancreas: There is a pancreatic head mass measuring 5.5 x 3 cm and there is atrophy of the pancreatic tail as well as dilatation of the distal pancreatic duct as also noted on ultrasound    - Spleen: Negative.    - Kidneys: Left renal cysts again noted.  No opaque calculi or  hydronephrosis.    - Adrenals: Unremarkable.    -Retroperitoneum:  No significant adenopathy.    - Vascular: No abdominal aortic aneurysm.    - Abdominal wall:  Unremarkable.    Pelvis:    No pelvic mass, adenopathy, or free fluid.  Bladder is unremarkable.    Bowel/Mesentery:    No evidence of bowel obstruction or inflammation.  Appendix appears normal.    Bones:  No acute osseous abnormality and no suspicious lytic or blastic lesion.                                  Assessment/Plan:     * Cholelithiasis  Abdominal US- Cholelithiasis. No convincing evidence of cholecystitis. Stable left lobe of the liver lesion as compared to prior ultrasound and CT suspicious for neoplasm    CT-Cholelithiasis.  Question is made of strandy changes in the pericholecystic fat raising question of cholecystitis.    Plan:  GI consult  IV antibiotics  PRN pain control  NPO-pending surgery consult        Hypothyroidism  Patient has chronic hypothyroidism. TFTs reviewed-   Lab Results   Component Value Date    TSH 26.720 (H) 10/08/2021   . Will continue chronic levothyroxine and adjust for and clinical changes.      Primary malignant neuroendocrine neoplasm of pancreas  Recently diagnosed with a neuroendocrine neoplasm of pancreas & receives care at [facility].    Plan:  Continue to monitor for acute changes  PRN pain control    Hypertension associated with diabetes  Chronic, controlled.  Latest blood pressure and vitals reviewed-   Temp:  [97.9 °F (36.6 °C)]   Pulse:  [76-94]   Resp:  [18]   BP: (118-146)/(56-65)   SpO2:  [98 %-100 %] .   Home meds for hypertension were reviewed and noted below.   Hypertension Medications             lisinopriL 10 MG tablet Take 10 mg by mouth once daily.    metoprolol succinate (TOPROL-XL) 50 MG 24 hr tablet Take 50 mg by mouth once daily.          While in the hospital, will manage blood pressure as follows; Continue home antihypertensive regimen    Will utilize p.r.n. blood pressure medication only  if patient's blood pressure greater than  180/110 and she develops symptoms such as worsening chest pain or shortness of breath.        Hyperlipidemia   Patient is chronically on statin.will continue for now. Monitor clinically. Last LDL was   Lab Results   Component Value Date    LDLCALC 39.6 (L) 10/08/2021            Type 2 diabetes mellitus, with long-term current use of insulin  Patient's FSGs are uncontrolled due to hyperglycemia on current medication regimen.  Last A1c reviewed-   Lab Results   Component Value Date    HGBA1C 13.3 (H) 10/08/2021     Most recent fingerstick glucose reviewed- No results for input(s): POCTGLUCOSE in the last 24 hours.  Current correctional scale  Medium  Maintain anti-hyperglycemic dose as follows-   Antihyperglycemics (From admission, onward)            Start     Stop Route Frequency Ordered    01/08/22 0100  insulin aspart U-100 pen 1-10 Units         -- SubQ Before meals & nightly PRN 01/08/22 0005        Hold Oral hypoglycemics while patient is in the hospital.          VTE Risk Mitigation (From admission, onward)         Ordered     Reason for No Pharmacological VTE Prophylaxis  Once        Question:  Reasons:  Answer:  Risk of Bleeding    01/08/22 0005     IP VTE HIGH RISK PATIENT  Once         01/08/22 0004     Place sequential compression device  Until discontinued         01/08/22 0004                   Britney Donis NP  Department of Hospital Medicine   [facility] - Emergency Dept

## 2022-01-08 NOTE — ASSESSMENT & PLAN NOTE
Recently diagnosed with a neuroendocrine neoplasm of pancreas & receives care at [facility].    Plan:  Continue to monitor for acute changes  PRN pain control

## 2022-01-08 NOTE — PLAN OF CARE
Patient AOX4 resting in bed, appears asleep, eyes closed, respirations even and unlabored. NAD. Denies SOB. VSS. Afebrile. No telemetry. Up with standby assistance. Room air. Medications administered per MD/NP order. Bed alarm active. Side Rails up X2. Plan of care reviewed with patient. Verbalizes understanding. Frequent checks performed Q2H. Call light in reach. Pt free from fall or injury. Will monitor.

## 2022-01-08 NOTE — ASSESSMENT & PLAN NOTE
Patient has chronic hypothyroidism. TFTs reviewed-   Lab Results   Component Value Date    TSH 26.720 (H) 10/08/2021   Will continue chronic levothyroxine and adjust for and clinical changes.  Repeat TSH ordered.

## 2022-01-08 NOTE — ASSESSMENT & PLAN NOTE
Patient's FSGs are uncontrolled due to hyperglycemia on current medication regimen.  Last A1c reviewed-   Lab Results   Component Value Date    HGBA1C 13.3 (H) 10/08/2021     Most recent fingerstick glucose reviewed-   Recent Labs   Lab 01/08/22  0623 01/08/22  1111   POCTGLUCOSE 166* 99     Current correctional scale  Medium  Maintain anti-hyperglycemic dose as follows-   Antihyperglycemics (From admission, onward)            Start     Stop Route Frequency Ordered    01/08/22 0100  insulin aspart U-100 pen 1-10 Units         -- SubQ Before meals & nightly PRN 01/08/22 0005        Hold Oral hypoglycemics while patient is in the hospital.

## 2022-01-08 NOTE — SUBJECTIVE & OBJECTIVE
Interval History: Patient with no acute events overnight. Continues to decline surgery if needed per nursing notes. Currently on liquid diet. Surgery to evaluate later today.     Review of Systems   Constitutional: Positive for appetite change. Negative for activity change, chills and diaphoresis.   HENT: Negative for congestion, dental problem and drooling.    Eyes: Negative for pain, discharge and itching.   Respiratory: Negative for apnea, choking and chest tightness.    Cardiovascular: Negative for chest pain, palpitations and leg swelling.   Gastrointestinal: Positive for abdominal pain and diarrhea. Negative for abdominal distention and blood in stool.   Endocrine: Negative for cold intolerance, heat intolerance, polydipsia and polyphagia.   Genitourinary: Negative for difficulty urinating, dyspareunia and dysuria.   Musculoskeletal: Negative for arthralgias, back pain and gait problem.   All other systems reviewed and are negative.    Objective:     Vital Signs (Most Recent):  Temp: 97 °F (36.1 °C) (01/08/22 0748)  Pulse: (!) 57 (01/08/22 0748)  Resp: 18 (01/08/22 0748)  BP: 128/70 (01/08/22 0748)  SpO2: 100 % (01/08/22 0748) Vital Signs (24h Range):  Temp:  [96.9 °F (36.1 °C)-97.9 °F (36.6 °C)] 97 °F (36.1 °C)  Pulse:  [57-94] 57  Resp:  [17-18] 18  SpO2:  [98 %-100 %] 100 %  BP: (118-146)/(56-70) 128/70     Weight: 59.5 kg (131 lb 2.8 oz)  Body mass index is 23.24 kg/m².    Intake/Output Summary (Last 24 hours) at 1/8/2022 1119  Last data filed at 1/8/2022 0030  Gross per 24 hour   Intake 100 ml   Output --   Net 100 ml      Physical Exam  Vitals and nursing note reviewed.   Constitutional:       Appearance: Normal appearance. She is normal weight.   HENT:      Head: Normocephalic and atraumatic.      Nose: Nose normal.      Mouth/Throat:      Mouth: Mucous membranes are moist.   Eyes:      Extraocular Movements: Extraocular movements intact.      Conjunctiva/sclera: Conjunctivae normal.      Pupils: Pupils  are equal, round, and reactive to light.   Cardiovascular:      Rate and Rhythm: Normal rate and regular rhythm.      Pulses: Normal pulses.      Heart sounds: Normal heart sounds.   Pulmonary:      Effort: Pulmonary effort is normal.      Breath sounds: Normal breath sounds.   Abdominal:      General: Abdomen is flat. Bowel sounds are normal.      Palpations: Abdomen is soft.   Musculoskeletal:         General: Normal range of motion.   Skin:     General: Skin is warm and dry.   Neurological:      General: No focal deficit present.      Mental Status: She is alert and oriented to person, place, and time. Mental status is at baseline.   Psychiatric:         Mood and Affect: Mood normal.         Behavior: Behavior normal.         Significant Labs: All pertinent labs within the past 24 hours have been reviewed.    Significant Imaging: I have reviewed all pertinent imaging results/findings within the past 24 hours.

## 2022-01-08 NOTE — HPI
Asha Lam is a 75 y/o female with PMHx of HTN, DM type II, Hypothyroidism,  and hyperlipidemia who presents with diarrhea and RUQ pain since this morning. Patient has had one episode of diarrhea this morning. Patient recently diagnosed with a neuroendocrine neoplasm of pancreas and is seen at [facility]. Patient was brought to ED by her daughter for further evaluation of RUQ pain. Daughter reports low grade fever to 99.8F that resolved with Tylenol.  Abdominal CT showed cholelithiasis. Question is made of strandy changes in the pericholecystic fat raising question of cholecystitis and Abdominal US cholelithiasis. No convincing evidence of cholecystitis. Stable left lobe of the liver lesion as compared to prior ultrasound and CT suspicious for neoplasm. Lab work revealed leukocytosis to 16.9.  ED provider spoke to Dr. Guo and patient started on IV Zosyn. Hospital medicine consulted and patient was seen in ED with her daughter, Felicity, at bedside.  Patient complaining of RUQ pain that increases with inspiration. Patient will be admitted to hospital medicine for further evaluation of her cholelithiasis.

## 2022-01-08 NOTE — NURSING
Updated pt of gen surgery consult called this AM to Dr Guo. Patients very adamant about refusing surgery today and wants to speak with her daughters and outpatient doctors. Pt states she will not agree to any surgery today but would like to speak with the gen surgeon later today. Pt requesting a diet and denies any pain stating she feels much better. Spoke with Dr Guo and updated him of above and that pt is wanting to eat. He ordered to start full liquid diet and advance as tolerated and he will be by this afternoon to see pt.

## 2022-01-08 NOTE — CARE UPDATE
01/08/22 0748   Patient Assessment/Suction   Level of Consciousness (AVPU) alert   PRE-TX-O2   O2 Device (Oxygen Therapy) room air   SpO2 100 %   Pulse Oximetry Type Intermittent   $ Pulse Oximetry - Multiple Charge Pulse Oximetry - Multiple   Pulse (!) 57   Resp 18

## 2022-01-08 NOTE — ASSESSMENT & PLAN NOTE
Patient has chronic hypothyroidism. TFTs reviewed-   Lab Results   Component Value Date    TSH 26.720 (H) 10/08/2021   . Will continue chronic levothyroxine and adjust for and clinical changes.

## 2022-01-08 NOTE — ASSESSMENT & PLAN NOTE
Chronic, controlled.  Latest blood pressure and vitals reviewed-   Temp:  [97.9 °F (36.6 °C)]   Pulse:  [76-94]   Resp:  [18]   BP: (118-146)/(56-65)   SpO2:  [98 %-100 %] .   Home meds for hypertension were reviewed and noted below.   Hypertension Medications             lisinopriL 10 MG tablet Take 10 mg by mouth once daily.    metoprolol succinate (TOPROL-XL) 50 MG 24 hr tablet Take 50 mg by mouth once daily.          While in the hospital, will manage blood pressure as follows; Continue home antihypertensive regimen    Will utilize p.r.n. blood pressure medication only if patient's blood pressure greater than  180/110 and she develops symptoms such as worsening chest pain or shortness of breath.

## 2022-01-08 NOTE — ASSESSMENT & PLAN NOTE
Patient's FSGs are uncontrolled due to hyperglycemia on current medication regimen.  Last A1c reviewed-   Lab Results   Component Value Date    HGBA1C 13.3 (H) 10/08/2021     Most recent fingerstick glucose reviewed- No results for input(s): POCTGLUCOSE in the last 24 hours.  Current correctional scale  Medium  Maintain anti-hyperglycemic dose as follows-   Antihyperglycemics (From admission, onward)            Start     Stop Route Frequency Ordered    01/08/22 0100  insulin aspart U-100 pen 1-10 Units         -- SubQ Before meals & nightly PRN 01/08/22 0005        Hold Oral hypoglycemics while patient is in the hospital.       Height as of this encounter: 6' 2\" (1.88 m). Weight as of this encounter: 236 lb (107 kg). Physical Exam  Vitals signs and nursing note reviewed. Constitutional:       General: He is not in acute distress. Appearance: He is well-developed. He is not diaphoretic. HENT:      Head: Normocephalic and atraumatic. Eyes:      Conjunctiva/sclera: Conjunctivae normal.   Neck:      Musculoskeletal: Normal range of motion. Pulmonary:      Effort: Pulmonary effort is normal.   Musculoskeletal:         General: Tenderness present. No swelling, deformity or signs of injury. Comments: No specific areas of pain with palpation to the anterior, lateral, or posterior aspects of the bilateral shoulders. Does have pain with abduction past 90 degrees and with forward movement of the upper extremities past 90 degrees. Pain with internal rotation and is only able to move to about 30% of expected internal rotation. Skin:     General: Skin is warm and dry. Coloration: Skin is not pale. Findings: No erythema or rash. Neurological:      Mental Status: He is alert and oriented to person, place, and time. Psychiatric:         Behavior: Behavior normal.         Thought Content: Thought content normal.         Judgment: Judgment normal.         ASSESSMENT/PLAN:  Encounter Diagnosis   Name Primary?     Chronic pain of both shoulders Yes     Orders Placed This Encounter   Medications    predniSONE (DELTASONE) 20 MG tablet     Si bid for 5 days, 1 qd for 5 days     Dispense:  15 tablet     Refill:  0     Orders Placed This Encounter   Procedures   Johnny Wang MD, Orthopedic Surgery, [place]     Referral Priority:   Routine     Referral Type:   Eval and Treat     Referral Reason:   Specialty Services Required     Referred to Provider:   Eldon Valerio MD     Requested Specialty:   Orthopedic Surgery     Number of Visits Requested:   1     Will give acute prednisone course to help with inflammation and swelling. Also did recommend orthopedic referral as this has been a persistent issue for over a year. Patient should avoid heavy repetitive lifting if able. Tylenol/Motrin prn for pain. Return  if no improvement in symptoms or if any further symptoms arise. No follow-ups on file. An electronic signature was used to authenticate this note.     --Abby Jerez, DO on 10/3/2020 at 9:20 AM

## 2022-01-08 NOTE — ASSESSMENT & PLAN NOTE
Discussed with patient and her daughter.  No acute findings consistent with acute cholecystitis.  For patient pain is been present for several months.  Agree with patient no indication for emergent surgical cholecystectomy.  Discussed with patient that the gallbladder would likely be removed anyway since the time of her surgery.  Patient expresses understanding.  Will advance her diet and surgery to sign off.

## 2022-01-08 NOTE — ASSESSMENT & PLAN NOTE
Abdominal US- Cholelithiasis. No convincing evidence of cholecystitis. Stable left lobe of the liver lesion as compared to prior ultrasound and CT suspicious for neoplasm    CT-Cholelithiasis.  Question is made of strandy changes in the pericholecystic fat raising question of cholecystitis.    Plan:  GI consult  IV antibiotics  PRN pain control  NPO-pending surgery consult

## 2022-01-08 NOTE — ASSESSMENT & PLAN NOTE
Patient is chronically on statin.will continue for now. Monitor clinically. Last LDL was   Lab Results   Component Value Date    LDLCALC 39.6 (L) 10/08/2021

## 2022-01-08 NOTE — ASSESSMENT & PLAN NOTE
Abdominal US- Cholelithiasis. No convincing evidence of cholecystitis. Stable left lobe of the liver lesion as compared to prior ultrasound and CT suspicious for neoplasm   CT-Cholelithiasis.  Question is made of strandy changes in the pericholecystic fat raising question of cholecystitis.    Plan:  General surgery consulted  Continue IV antibiotics  PRN pain control

## 2022-01-08 NOTE — ASSESSMENT & PLAN NOTE
Chronic, controlled.  Latest blood pressure and vitals reviewed-   Temp:  [96.9 °F (36.1 °C)-97.9 °F (36.6 °C)]   Pulse:  [57-94]   Resp:  [17-18]   BP: (118-146)/(56-70)   SpO2:  [98 %-100 %] .   Home meds for hypertension were reviewed and noted below.   Hypertension Medications             lisinopriL 10 MG tablet Take 10 mg by mouth once daily.    metoprolol succinate (TOPROL-XL) 50 MG 24 hr tablet Take 50 mg by mouth once daily.          While in the hospital, will manage blood pressure as follows; Continue home antihypertensive regimen    Will utilize p.r.n. blood pressure medication only if patient's blood pressure greater than  180/110 and she develops symptoms such as worsening chest pain or shortness of breath.

## 2022-01-08 NOTE — HPI
76-year-old female who presented to the ER yesterday evening with abdominal pain located in the right upper quadrant.  Initially per the ER physician pain was felt to be in the right upper quadrant radiating around to her right flank.  On CT scan she did have cholelithiasis which was also confirmed on ultrasound.  There was concern for cholecystitis.  Ultrasound did confirm cholelithiasis but no other findings consistent with cholecystitis.  Given concern for cholecystitis patient was admitted with initial plans for surgical intervention.  Patient notes this morning that she is having no pain.  Upon further discussion with the patient she notes she has been having chronic pain in the right upper quadrant for the last several months.  She notes this began several months ago and has been fairly constant.  She believes this is related to a neuroendocrine tumor in her pancreas.  This is currently being worked up at another facility.  Reportedly there are plans for surgical resection in the near future.  Today patient notes her pain is much improved and states that she would prefer to not have surgery at the present time.  Patient has no significant past surgical history.  Her past medical history significant for hypertension, hyperlipidemia and a neuroendocrine tumor of the pancreas.

## 2022-01-08 NOTE — SUBJECTIVE & OBJECTIVE
No current facility-administered medications on file prior to encounter.     Current Outpatient Medications on File Prior to Encounter   Medication Sig    aspirin (ECOTRIN) 81 MG EC tablet Take 81 mg by mouth once daily.    atorvastatin (LIPITOR) 20 MG tablet Take 20 mg by mouth once daily.    levothyroxine (SYNTHROID) 88 MCG tablet Take 88 mcg by mouth before breakfast.    lisinopriL 10 MG tablet Take 10 mg by mouth once daily.    metFORMIN (GLUCOPHAGE) 500 MG tablet Take 500 mg by mouth 2 (two) times daily with meals.    metoprolol succinate (TOPROL-XL) 50 MG 24 hr tablet Take 50 mg by mouth once daily.    pantoprazole (PROTONIX) 40 MG tablet Take 40 mg by mouth once daily.    TOUJEO SOLOSTAR U-300 INSULIN 300 unit/mL (1.5 mL) InPn pen SMARTSIG:10 Unit(s) SUB-Q Daily    vitamin E 400 UNIT capsule Take 400 Units by mouth once daily.    [DISCONTINUED] SENSIPAR 30 mg Tab TAKE 1 TABLET BY MOUTH ONCE DAILY AS DIRECTED       Review of patient's allergies indicates:  No Known Allergies    Past Medical History:   Diagnosis Date    Diabetes mellitus     Hypertension     Hyperthyroidism     Hypothyroid      Past Surgical History:   Procedure Laterality Date    ANKLE SURGERY Left      SECTION      HYSTERECTOMY       Family History     Family history is unknown by patient.        Tobacco Use    Smoking status: Never Smoker    Smokeless tobacco: Never Used   Substance and Sexual Activity    Alcohol use: Not Currently    Drug use: Never    Sexual activity: Not on file     Review of Systems   Constitutional: Negative for activity change and appetite change.   Gastrointestinal: Positive for abdominal pain and diarrhea.   Musculoskeletal: Negative for arthralgias and joint swelling.     Objective:     Vital Signs (Most Recent):  Temp: 99.9 °F (37.7 °C) (22 1602)  Pulse: 67 (22 1602)  Resp: 16 (22 1602)  BP: 135/60 (22 1602)  SpO2: 97 % (22 1602) Vital Signs (24h  Range):  Temp:  [96.9 °F (36.1 °C)-99.9 °F (37.7 °C)] 99.9 °F (37.7 °C)  Pulse:  [57-94] 67  Resp:  [16-18] 16  SpO2:  [97 %-100 %] 97 %  BP: (118-146)/(56-70) 135/60     Weight: 59.5 kg (131 lb 2.8 oz)  Body mass index is 23.24 kg/m².    Physical Exam  Vitals reviewed.   Cardiovascular:      Rate and Rhythm: Normal rate.      Pulses: Normal pulses.      Heart sounds: No murmur heard.      Pulmonary:      Effort: Pulmonary effort is normal. No respiratory distress.   Abdominal:      General: Abdomen is flat. Bowel sounds are normal. There is no distension.      Tenderness: There is no abdominal tenderness.   Neurological:      Mental Status: She is alert.         Significant Labs:  I have reviewed all pertinent lab results within the past 24 hours.  CBC:   Recent Labs   Lab 01/07/22 2050   WBC 16.90*   RBC 3.88*   HGB 10.3*   HCT 33.0*      MCV 85   MCH 26.5*   MCHC 31.2*     BMP:   Recent Labs   Lab 01/07/22 2050   *      K 3.9      CO2 21*   BUN 13   CREATININE 1.0   CALCIUM 10.4     CMP:   Recent Labs   Lab 01/07/22 2050   *   CALCIUM 10.4   ALBUMIN 3.6   PROT 6.1      K 3.9   CO2 21*      BUN 13   CREATININE 1.0   ALKPHOS 75   ALT 79*   AST 67*   BILITOT 0.5       Significant Diagnostics:  CT and US reviewed.  Cholelithaisis noted.  Mass at head of pancreas

## 2022-01-08 NOTE — ED PROVIDER NOTES
Dictation #1  MRN:[id number]  CSN:[id number]  Encounter Date: 2022    SCRIBE #1 NOTE: I, Kenyatta Santana, am scribing for, and in the presence of, Stuart Foster MD.       History     Chief Complaint   Patient presents with    Diarrhea     With chills and dizziness      Time seen by provider: 8:24 PM on 2022    Asha Lam is a 76 y.o. female who presents to the ED with abdominal pain, chills, and diarrhea that began this morning. Pt reports blood sugar was 67 this morning and 87 after eating some food. Pt reports 99.7° temperature this morning. Pt was recently diagnosed with a neuroendocrine neoplasm of pancreas. Pt reports taking 4 extra strength tylenol per day. Pt reports sneezing, coughing, and deep breaths making abdominal pain worse. Pt endorses recent rhinorrhea and appetite change. Pt has COVID-19 vaccine. The patient denies fever, congestion, cough, chest pain, N/V, blood in stool, SOB, or any other symptoms at this time. PMHx of DM and HTN. PSHx of hysterectomy and  section.     The history is provided by the patient and a relative.     Review of patient's allergies indicates:  No Known Allergies  Past Medical History:   Diagnosis Date    Diabetes mellitus     Hypertension     Hyperthyroidism     Hypothyroid      Past Surgical History:   Procedure Laterality Date    ANKLE SURGERY Left      SECTION      HYSTERECTOMY       History reviewed. No pertinent family history.  Social History     Tobacco Use    Smoking status: Never Smoker    Smokeless tobacco: Never Used   Substance Use Topics    Drug use: Never     Review of Systems   Constitutional: Positive for appetite change and chills. Negative for activity change, diaphoresis and fever.   HENT: Positive for rhinorrhea. Negative for ear pain, sore throat and trouble swallowing.    Eyes: Negative for pain and visual disturbance.   Respiratory: Negative for cough, shortness of breath and stridor.    Cardiovascular:  Negative for chest pain.   Gastrointestinal: Positive for abdominal pain and diarrhea. Negative for blood in stool, nausea and vomiting.   Genitourinary: Negative for dysuria, hematuria, vaginal bleeding and vaginal discharge.   Musculoskeletal: Negative for gait problem.   Skin: Negative for rash and wound.   Neurological: Negative for seizures and headaches.   Psychiatric/Behavioral: Negative for hallucinations and suicidal ideas.       Physical Exam     Initial Vitals [01/07/22 1737]   BP Pulse Resp Temp SpO2   (!) 119/56 94 18 97.9 °F (36.6 °C) 98 %      MAP       --         Physical Exam    Nursing note and vitals reviewed.  Constitutional: She appears well-developed. She is not diaphoretic. No distress.   Elderly appearing.    HENT:   Head: Normocephalic and atraumatic.   Nose: Nose normal.   Eyes: EOM are normal. No scleral icterus.   Neck: Neck supple.   Normal range of motion.  Cardiovascular: Normal rate, regular rhythm, normal heart sounds and intact distal pulses. Exam reveals no gallop and no friction rub.    No murmur heard.  Pulmonary/Chest: Breath sounds normal. No stridor. No respiratory distress. She has no wheezes. She has no rhonchi. She has no rales.   Abdominal: Abdomen is soft. Bowel sounds are normal. She exhibits no distension. There is abdominal tenderness (RUQ) in the right upper quadrant. There is no rebound and no guarding.   Musculoskeletal:         General: Normal range of motion.      Cervical back: Normal range of motion and neck supple.     Neurological: She is alert and oriented to person, place, and time. She has normal strength. No cranial nerve deficit or sensory deficit.   Skin: Skin is warm and dry. Capillary refill takes less than 2 seconds. No rash noted.   Psychiatric: She has a normal mood and affect.         ED Course   Procedures  Labs Reviewed   CBC W/ AUTO DIFFERENTIAL - Abnormal; Notable for the following components:       Result Value    WBC 16.90 (*)     RBC 3.88  (*)     Hemoglobin 10.3 (*)     Hematocrit 33.0 (*)     MCH 26.5 (*)     MCHC 31.2 (*)     RDW 15.6 (*)     Gran # (ANC) 15.8 (*)     Immature Grans (Abs) 0.08 (*)     Lymph # 0.4 (*)     Gran % 93.2 (*)     Lymph % 2.6 (*)     Mono % 3.5 (*)     All other components within normal limits   COMPREHENSIVE METABOLIC PANEL - Abnormal; Notable for the following components:    CO2 21 (*)     Glucose 297 (*)     AST 67 (*)     ALT 79 (*)     eGFR if non  56 (*)     All other components within normal limits   URINALYSIS, REFLEX TO URINE CULTURE - Abnormal; Notable for the following components:    Specific Gravity, UA <=1.005 (*)     All other components within normal limits    Narrative:     Specimen Source->Urine   INFLUENZA A & B BY MOLECULAR   SARS-COV-2 RNA AMPLIFICATION, QUAL   LIPASE          Imaging Results          US Abdomen Limited (Final result)  Result time 01/07/22 23:01:49    Final result by Jacey Wilson MD (01/07/22 23:01:49)                 Impression:      Cholelithiasis.  No convincing evidence of cholecystitis.    Stable left lobe of the liver lesion as compared to prior ultrasound and CT suspicious for neoplasm.  The right lobe of the liver lesion described on CT is not demonstrated on this exam.    Nonvisualization of the pancreas secondary to bowel gas.      Electronically signed by: Jacey Wilson  Date:    01/07/2022  Time:    23:01             Narrative:    EXAMINATION:  US ABDOMEN LIMITED    CLINICAL HISTORY:  cholecystitis;    TECHNIQUE:  Limited ultrasound of the right upper quadrant of the abdomen (including pancreas, liver, gallbladder, common bile duct, and spleen) was performed.    COMPARISON:  CT abdomen pelvis also obtained same date 01/07/2022, right upper quadrant ultrasound 11/15/2021.    FINDINGS:  Liver: Normal in size, measuring 13.4 cm. There is a stable hypoechoic 2.5 x 1.5 x 2.5 cm lesion left lobe of the liver.  No other focal lesions are identified as  imaged.    Gallbladder: There is cholelithiasis.  No gallbladder wall thickening or pericholecystic fluid to suggest cholecystitis.  There was a no sonographic Martino sign.    Pancreas is obscured by gas.    Biliary system: The common duct is not dilated, measuring 4 mm.  No intrahepatic ductal dilatation.    Spleen: Normal in size and echotexture, measuring 6.9 x 3.1 cm.    Miscellaneous: No upper abdominal ascites.                                CT Abdomen Pelvis With Contrast (Final result)  Result time 01/07/22 21:55:46    Final result by Jacey Wilson MD (01/07/22 21:55:46)                 Impression:      Cholelithiasis.  Question is made of strandy changes in the pericholecystic fat raising question of cholecystitis.    Mass replacing the pancreatic head with atrophy of the pancreatic body and tail and dilatation of the common bile duct compatible neoplasm.    Two nonspecific lesions in the liver as described stable since prior  ultrasound 11/15/2021.  Correlate with workup for metastatic disease.  Minor prominence of the central intrahepatic biliary ducts.    Left renal cysts.    This report was flagged in Epic as abnormal.      Electronically signed by: Jacey Wilson  Date:    01/07/2022  Time:    21:55             Narrative:    EXAMINATION:  CT ABDOMEN PELVIS WITH CONTRAST    CLINICAL HISTORY:  Abdominal pain, acute, nonlocalized;Neuroendocrine tumor w abdominal pain;    TECHNIQUE:  Low dose axial images, sagittal and coronal reformations were obtained from the lung bases to the pubic symphysis before and following the IV administration of 75 of mL of Omnipaque 350 and the oral administration of 30 mL of Omnipaque 350..    COMPARISON:  None    FINDINGS:  Abdomen:    - Lung bases: There is a right-sided pleural effusion with borderline simple Hounsfield unit measurements.  There is compressive atelectasis of the right lung base otherwise lung bases are clear.  Heart is not significantly  enlarged.    - Liver: There is hypoechoic mass measuring 2 cm in the left lobe.  There is also heterogeneous area with margins not as well-defined in the right lobe measuring 3 cm maximally corresponding to ultrasound findings.  Mild dilatation of the central hepatic biliary ducts..  There is no hepatomegaly    - Gallbladder: Note is made of gallstones in the gallbladder.  Subtle strandy changes around the gallbladder raise a question of inflammation.    -pancreas: There is a pancreatic head mass measuring 5.5 x 3 cm and there is atrophy of the pancreatic tail as well as dilatation of the distal pancreatic duct as also noted on ultrasound    - Spleen: Negative.    - Kidneys: Left renal cysts again noted.  No opaque calculi or hydronephrosis.    - Adrenals: Unremarkable.    -Retroperitoneum:  No significant adenopathy.    - Vascular: No abdominal aortic aneurysm.    - Abdominal wall:  Unremarkable.    Pelvis:    No pelvic mass, adenopathy, or free fluid.  Bladder is unremarkable.    Bowel/Mesentery:    No evidence of bowel obstruction or inflammation.  Appendix appears normal.    Bones:  No acute osseous abnormality and no suspicious lytic or blastic lesion.                                 Medications   sodium chloride 0.9% flush 10 mL (has no administration in time range)   potassium bicarbonate disintegrating tablet 50 mEq (has no administration in time range)   potassium bicarbonate disintegrating tablet 35 mEq (has no administration in time range)   potassium bicarbonate disintegrating tablet 60 mEq (has no administration in time range)   magnesium oxide tablet 800 mg (has no administration in time range)   magnesium oxide tablet 800 mg (has no administration in time range)   potassium, sodium phosphates 280-160-250 mg packet 2 packet (has no administration in time range)   potassium, sodium phosphates 280-160-250 mg packet 2 packet (has no administration in time range)   potassium, sodium phosphates 280-160-250  mg packet 2 packet (has no administration in time range)   senna-docusate 8.6-50 mg per tablet 1 tablet (has no administration in time range)   ondansetron injection 4 mg (has no administration in time range)   prochlorperazine injection Soln 5 mg (has no administration in time range)   glucose chewable tablet 16 g (has no administration in time range)   glucose chewable tablet 24 g (has no administration in time range)   dextrose 50% injection 12.5 g (has no administration in time range)   dextrose 50% injection 25 g (has no administration in time range)   glucagon (human recombinant) injection 1 mg (has no administration in time range)   acetaminophen tablet 650 mg (has no administration in time range)   melatonin tablet 6 mg (has no administration in time range)   simethicone chewable tablet 80 mg (has no administration in time range)   aluminum-magnesium hydroxide-simethicone 200-200-20 mg/5 mL suspension 30 mL (has no administration in time range)   naloxone 0.4 mg/mL injection 0.02 mg (has no administration in time range)   HYDROcodone-acetaminophen 5-325 mg per tablet 1 tablet (has no administration in time range)   HYDROcodone-acetaminophen  mg per tablet 1 tablet (has no administration in time range)   insulin aspart U-100 pen 1-10 Units (has no administration in time range)   atorvastatin tablet 20 mg (has no administration in time range)   levothyroxine tablet 75 mcg (has no administration in time range)   lisinopriL tablet 10 mg (has no administration in time range)   metoprolol succinate (TOPROL-XL) 24 hr tablet 50 mg (has no administration in time range)   piperacillin-tazobactam 4.5 g in dextrose 5 % 100 mL IVPB (ready to mix system) (has no administration in time range)   iohexoL (OMNIPAQUE 350) 350 mg iodine/mL injection (75 mLs  Given 1/7/22 2113)   piperacillin-tazobactam 4.5 g in dextrose 5 % 100 mL IVPB (ready to mix system) (4.5 g Intravenous New Bag 1/7/22 4628)     Medical Decision  Making:   History:   Old Medical Records: I decided to obtain old medical records.  Clinical Tests:   Lab Tests: Ordered and Reviewed  Radiological Study: Reviewed and Ordered          Scribe Attestation:   Scribe #1: I performed the above scribed service and the documentation accurately describes the services I performed. I attest to the accuracy of the note.        ED Course as of 01/08/22 0009   Fri Jan 07, 2022 2059 WBC(!): 16.90 [BD]   2100 75 yo F with recent diagnosis of stage II/III pancreatic neuroendocrine tumor with questionable liver involvement pw RUQ abdominal pain and diarrhea. [BD]   2207 Impression:     Cholelithiasis.  Question is made of strandy changes in the pericholecystic fat raising question of cholecystitis.     Mass replacing the pancreatic head with atrophy of the pancreatic body and tail and dilatation of the common bile duct compatible neoplasm.     Two nonspecific lesions in the liver as described stable since prior  ultrasound 11/15/2021.  Correlate with workup for metastatic disease.  Minor prominence of the central intrahepatic biliary ducts.     Left renal cysts.     This report was flagged in Epic as abnormal.        Electronically signed by: Jacey Wilson  Date:                                            01/07/2022  Time:                                           21:55 [BD]   3881 Discussed case with Dr. Guo, general surgery  who recommended admission with IV antibiotics and he will evaluate in the AM. Pt accepted by hospital medicine for further management. US pending at time of admission [BD]      ED Course User Index  [BD] Stuart Foster MD            Attending Attestation:     Physician Attestation for Scribe:    I, Dr. Stuart Foster, personally performed the services described in this documentation.   All medical record entries made by the scribe were at my direction and in my presence.   I have reviewed the chart and agree that the record is accurate and complete.    Stuart Foster MD  12:09 AM 01/08/2022     DISCLAIMER: This note was prepared with [employer] voice recognition transcription software. Garbled syntax, mangled pronouns, and other bizarre constructions may be attributed to that software system.      Clinical Impression:   Final diagnoses:  [K81.9] Cholecystitis (Primary)          ED Disposition Condition    Observation               Stuart Foster MD  01/08/22 0009

## 2022-01-08 NOTE — SUBJECTIVE & OBJECTIVE
Past Medical History:   Diagnosis Date    Diabetes mellitus     Hypertension     Hyperthyroidism     Hypothyroid        Past Surgical History:   Procedure Laterality Date    ANKLE SURGERY Left      SECTION      HYSTERECTOMY         Review of patient's allergies indicates:  No Known Allergies    No current facility-administered medications on file prior to encounter.     Current Outpatient Medications on File Prior to Encounter   Medication Sig    aspirin (ECOTRIN) 81 MG EC tablet Take 81 mg by mouth once daily.    atorvastatin (LIPITOR) 20 MG tablet Take 20 mg by mouth once daily.    levothyroxine (SYNTHROID) 88 MCG tablet Take 88 mcg by mouth before breakfast.    lisinopriL 10 MG tablet Take 10 mg by mouth once daily.    metFORMIN (GLUCOPHAGE) 500 MG tablet Take 500 mg by mouth 2 (two) times daily with meals.    metoprolol succinate (TOPROL-XL) 50 MG 24 hr tablet Take 50 mg by mouth once daily.    pantoprazole (PROTONIX) 40 MG tablet Take 40 mg by mouth once daily.    TOUJEO SOLOSTAR U-300 INSULIN 300 unit/mL (1.5 mL) InPn pen SMARTSIG:10 Unit(s) SUB-Q Daily    vitamin E 400 UNIT capsule Take 400 Units by mouth once daily.    [DISCONTINUED] SENSIPAR 30 mg Tab TAKE 1 TABLET BY MOUTH ONCE DAILY AS DIRECTED     Family History    None       Tobacco Use    Smoking status: Never Smoker    Smokeless tobacco: Never Used   Substance and Sexual Activity    Alcohol use: Not on file    Drug use: Never    Sexual activity: Not on file     Review of Systems   Constitutional: Positive for appetite change, fever and unexpected weight change. Negative for activity change and chills.   HENT: Negative for congestion, sore throat and trouble swallowing.    Eyes: Negative for photophobia and visual disturbance.   Respiratory: Negative for cough, chest tightness and shortness of breath.    Cardiovascular: Negative for chest pain, palpitations and leg swelling.   Gastrointestinal: Positive for abdominal pain  and diarrhea. Negative for nausea.   Genitourinary: Negative for dysuria, flank pain and hematuria.   Musculoskeletal: Negative for back pain.   Neurological: Negative for dizziness, weakness and headaches.   Psychiatric/Behavioral: Negative for confusion.     Objective:     Vital Signs (Most Recent):  Temp: 97.9 °F (36.6 °C) (01/07/22 1737)  Pulse: 76 (01/07/22 2302)  Resp: 18 (01/07/22 2302)  BP: (!) 146/65 (01/07/22 2302)  SpO2: 98 % (01/07/22 2302) Vital Signs (24h Range):  Temp:  [97.9 °F (36.6 °C)] 97.9 °F (36.6 °C)  Pulse:  [76-94] 76  Resp:  [18] 18  SpO2:  [98 %-100 %] 98 %  BP: (118-146)/(56-65) 146/65     Weight: 55.3 kg (122 lb)  Body mass index is 21.61 kg/m².    Physical Exam  Vitals reviewed.   Constitutional:       Appearance: Normal appearance. She is normal weight.   HENT:      Head: Normocephalic.      Mouth/Throat:      Mouth: Mucous membranes are moist.      Pharynx: Oropharynx is clear.   Eyes:      Pupils: Pupils are equal, round, and reactive to light.   Cardiovascular:      Rate and Rhythm: Normal rate and regular rhythm.      Pulses: Normal pulses.      Heart sounds: Normal heart sounds.   Pulmonary:      Effort: Pulmonary effort is normal.      Breath sounds: Normal breath sounds.   Abdominal:      General: Bowel sounds are normal.      Tenderness: There is abdominal tenderness in the right upper quadrant.   Musculoskeletal:         General: Normal range of motion.      Cervical back: Normal range of motion.   Skin:     General: Skin is warm and dry.   Neurological:      Mental Status: She is alert and oriented to person, place, and time. Mental status is at baseline.   Psychiatric:         Mood and Affect: Mood normal.           CRANIAL NERVES     CN III, IV, VI   Pupils are equal, round, and reactive to light.       Significant Labs:   All pertinent labs within the past 24 hours have been reviewed.  CBC:   Recent Labs   Lab 01/07/22 2050   WBC 16.90*   HGB 10.3*   HCT 33.0*         CMP:   Recent Labs   Lab 01/07/22 2050      K 3.9      CO2 21*   *   BUN 13   CREATININE 1.0   CALCIUM 10.4   PROT 6.1   ALBUMIN 3.6   BILITOT 0.5   ALKPHOS 75   AST 67*   ALT 79*   ANIONGAP 8   EGFRNONAA 56*       Significant Imaging: I have reviewed all pertinent imaging results/findings within the past 24 hours.     Imaging Results          US Abdomen Limited (Final result)  Result time 01/07/22 23:01:49    Final result by Jacey Wilson MD (01/07/22 23:01:49)                 Impression:      Cholelithiasis.  No convincing evidence of cholecystitis.    Stable left lobe of the liver lesion as compared to prior ultrasound and CT suspicious for neoplasm.  The right lobe of the liver lesion described on CT is not demonstrated on this exam.    Nonvisualization of the pancreas secondary to bowel gas.      Electronically signed by: Jacey Wilson  Date:    01/07/2022  Time:    23:01             Narrative:    EXAMINATION:  US ABDOMEN LIMITED    CLINICAL HISTORY:  cholecystitis;    TECHNIQUE:  Limited ultrasound of the right upper quadrant of the abdomen (including pancreas, liver, gallbladder, common bile duct, and spleen) was performed.    COMPARISON:  CT abdomen pelvis also obtained same date 01/07/2022, right upper quadrant ultrasound 11/15/2021.    FINDINGS:  Liver: Normal in size, measuring 13.4 cm. There is a stable hypoechoic 2.5 x 1.5 x 2.5 cm lesion left lobe of the liver.  No other focal lesions are identified as imaged.    Gallbladder: There is cholelithiasis.  No gallbladder wall thickening or pericholecystic fluid to suggest cholecystitis.  There was a no sonographic Martino sign.    Pancreas is obscured by gas.    Biliary system: The common duct is not dilated, measuring 4 mm.  No intrahepatic ductal dilatation.    Spleen: Normal in size and echotexture, measuring 6.9 x 3.1 cm.    Miscellaneous: No upper abdominal ascites.                                CT Abdomen Pelvis With  Contrast (Final result)  Result time 01/07/22 21:55:46    Final result by Jacey Wilson MD (01/07/22 21:55:46)                 Impression:      Cholelithiasis.  Question is made of strandy changes in the pericholecystic fat raising question of cholecystitis.    Mass replacing the pancreatic head with atrophy of the pancreatic body and tail and dilatation of the common bile duct compatible neoplasm.    Two nonspecific lesions in the liver as described stable since prior  ultrasound 11/15/2021.  Correlate with workup for metastatic disease.  Minor prominence of the central intrahepatic biliary ducts.    Left renal cysts.    This report was flagged in Epic as abnormal.      Electronically signed by: Jacey Wilson  Date:    01/07/2022  Time:    21:55             Narrative:    EXAMINATION:  CT ABDOMEN PELVIS WITH CONTRAST    CLINICAL HISTORY:  Abdominal pain, acute, nonlocalized;Neuroendocrine tumor w abdominal pain;    TECHNIQUE:  Low dose axial images, sagittal and coronal reformations were obtained from the lung bases to the pubic symphysis before and following the IV administration of 75 of mL of Omnipaque 350 and the oral administration of 30 mL of Omnipaque 350..    COMPARISON:  None    FINDINGS:  Abdomen:    - Lung bases: There is a right-sided pleural effusion with borderline simple Hounsfield unit measurements.  There is compressive atelectasis of the right lung base otherwise lung bases are clear.  Heart is not significantly enlarged.    - Liver: There is hypoechoic mass measuring 2 cm in the left lobe.  There is also heterogeneous area with margins not as well-defined in the right lobe measuring 3 cm maximally corresponding to ultrasound findings.  Mild dilatation of the central hepatic biliary ducts..  There is no hepatomegaly    - Gallbladder: Note is made of gallstones in the gallbladder.  Subtle strandy changes around the gallbladder raise a question of inflammation.    -pancreas: There is a  pancreatic head mass measuring 5.5 x 3 cm and there is atrophy of the pancreatic tail as well as dilatation of the distal pancreatic duct as also noted on ultrasound    - Spleen: Negative.    - Kidneys: Left renal cysts again noted.  No opaque calculi or hydronephrosis.    - Adrenals: Unremarkable.    -Retroperitoneum:  No significant adenopathy.    - Vascular: No abdominal aortic aneurysm.    - Abdominal wall:  Unremarkable.    Pelvis:    No pelvic mass, adenopathy, or free fluid.  Bladder is unremarkable.    Bowel/Mesentery:    No evidence of bowel obstruction or inflammation.  Appendix appears normal.    Bones:  No acute osseous abnormality and no suspicious lytic or blastic lesion.

## 2022-01-09 VITALS
TEMPERATURE: 98 F | BODY MASS INDEX: 22.66 KG/M2 | RESPIRATION RATE: 17 BRPM | HEIGHT: 63 IN | HEART RATE: 56 BPM | DIASTOLIC BLOOD PRESSURE: 58 MMHG | SYSTOLIC BLOOD PRESSURE: 122 MMHG | WEIGHT: 127.88 LBS | OXYGEN SATURATION: 98 %

## 2022-01-09 LAB
POCT GLUCOSE: 130 MG/DL (ref 70–110)
POCT GLUCOSE: 95 MG/DL (ref 70–110)
TSH SERPL DL<=0.005 MIU/L-ACNC: 3.58 UIU/ML (ref 0.4–4)

## 2022-01-09 PROCEDURE — 25000003 PHARM REV CODE 250: Performed by: NURSE PRACTITIONER

## 2022-01-09 PROCEDURE — 84443 ASSAY THYROID STIM HORMONE: CPT | Performed by: INTERNAL MEDICINE

## 2022-01-09 PROCEDURE — 94761 N-INVAS EAR/PLS OXIMETRY MLT: CPT

## 2022-01-09 PROCEDURE — G0378 HOSPITAL OBSERVATION PER HR: HCPCS

## 2022-01-09 PROCEDURE — 63600175 PHARM REV CODE 636 W HCPCS: Performed by: NURSE PRACTITIONER

## 2022-01-09 PROCEDURE — 96366 THER/PROPH/DIAG IV INF ADDON: CPT

## 2022-01-09 PROCEDURE — 36415 COLL VENOUS BLD VENIPUNCTURE: CPT | Performed by: INTERNAL MEDICINE

## 2022-01-09 RX ORDER — LEVOTHYROXINE SODIUM 88 UG/1
88 TABLET ORAL
Qty: 90 TABLET | Refills: 3 | Status: SHIPPED | OUTPATIENT
Start: 2022-01-09

## 2022-01-09 RX ORDER — METOPROLOL SUCCINATE 25 MG/1
25 TABLET, EXTENDED RELEASE ORAL DAILY
Status: DISCONTINUED | OUTPATIENT
Start: 2022-01-10 | End: 2022-01-09 | Stop reason: HOSPADM

## 2022-01-09 RX ORDER — METOPROLOL SUCCINATE 25 MG/1
25 TABLET, EXTENDED RELEASE ORAL DAILY
Qty: 90 TABLET | Refills: 3 | Status: SHIPPED | OUTPATIENT
Start: 2022-01-09

## 2022-01-09 RX ORDER — LEVOTHYROXINE SODIUM 50 UG/1
100 TABLET ORAL
Status: DISCONTINUED | OUTPATIENT
Start: 2022-01-10 | End: 2022-01-09 | Stop reason: HOSPADM

## 2022-01-09 RX ORDER — LEVOTHYROXINE SODIUM 100 UG/1
100 TABLET ORAL
Qty: 90 TABLET | Refills: 3 | Status: SHIPPED | OUTPATIENT
Start: 2022-01-09 | End: 2022-01-09 | Stop reason: SDUPTHER

## 2022-01-09 RX ADMIN — HYDROCODONE BITARTRATE AND ACETAMINOPHEN 1 TABLET: 10; 325 TABLET ORAL at 10:01

## 2022-01-09 RX ADMIN — METOPROLOL SUCCINATE 50 MG: 50 TABLET, EXTENDED RELEASE ORAL at 10:01

## 2022-01-09 RX ADMIN — ATORVASTATIN CALCIUM 20 MG: 20 TABLET, FILM COATED ORAL at 10:01

## 2022-01-09 RX ADMIN — LEVOTHYROXINE SODIUM 75 MCG: 0.03 TABLET ORAL at 05:01

## 2022-01-09 RX ADMIN — PIPERACILLIN AND TAZOBACTAM 4.5 G: 4; .5 INJECTION, POWDER, LYOPHILIZED, FOR SOLUTION INTRAVENOUS; PARENTERAL at 05:01

## 2022-01-09 RX ADMIN — SENNOSIDES AND DOCUSATE SODIUM 1 TABLET: 50; 8.6 TABLET ORAL at 10:01

## 2022-01-09 RX ADMIN — LISINOPRIL 10 MG: 10 TABLET ORAL at 10:01

## 2022-01-09 NOTE — SUBJECTIVE & OBJECTIVE
Interval History: Patient with no acute events overnight. Surgery evaluated the patient and recommended no intervention at this time. Patient stable for discharge.    Review of Systems   Constitutional: Positive for appetite change. Negative for activity change, chills and diaphoresis.   HENT: Negative for congestion, dental problem and drooling.    Eyes: Negative for pain, discharge and itching.   Respiratory: Negative for apnea, choking and chest tightness.    Cardiovascular: Negative for chest pain, palpitations and leg swelling.   Gastrointestinal: Positive for abdominal pain and diarrhea. Negative for abdominal distention and blood in stool.   Endocrine: Negative for cold intolerance, heat intolerance, polydipsia and polyphagia.   Genitourinary: Negative for difficulty urinating, dyspareunia and dysuria.   Musculoskeletal: Negative for arthralgias, back pain and gait problem.   All other systems reviewed and are negative.    Objective:     Vital Signs (Most Recent):  Temp: 97.9 °F (36.6 °C) (01/09/22 1159)  Pulse: (!) 56 (01/09/22 1159)  Resp: 17 (01/09/22 1159)  BP: (!) 122/58 (01/09/22 1159)  SpO2: 98 % (01/09/22 1159) Vital Signs (24h Range):  Temp:  [96.5 °F (35.8 °C)-99.9 °F (37.7 °C)] 97.9 °F (36.6 °C)  Pulse:  [56-73] 56  Resp:  [14-18] 17  SpO2:  [93 %-98 %] 98 %  BP: (113-141)/(56-64) 122/58     Weight: 58 kg (127 lb 13.9 oz)  Body mass index is 22.65 kg/m².    Intake/Output Summary (Last 24 hours) at 1/9/2022 1307  Last data filed at 1/9/2022 1200  Gross per 24 hour   Intake 646.96 ml   Output 600 ml   Net 46.96 ml      Physical Exam  Vitals and nursing note reviewed.   Constitutional:       Appearance: Normal appearance. She is normal weight.   HENT:      Head: Normocephalic and atraumatic.      Nose: Nose normal.      Mouth/Throat:      Mouth: Mucous membranes are moist.   Eyes:      Extraocular Movements: Extraocular movements intact.      Conjunctiva/sclera: Conjunctivae normal.      Pupils:  Pupils are equal, round, and reactive to light.   Cardiovascular:      Rate and Rhythm: Normal rate and regular rhythm.      Pulses: Normal pulses.      Heart sounds: Normal heart sounds.   Pulmonary:      Effort: Pulmonary effort is normal.      Breath sounds: Normal breath sounds.   Abdominal:      General: Abdomen is flat. Bowel sounds are normal.      Palpations: Abdomen is soft.   Musculoskeletal:         General: Normal range of motion.   Skin:     General: Skin is warm and dry.   Neurological:      General: No focal deficit present.      Mental Status: She is alert and oriented to person, place, and time. Mental status is at baseline.   Psychiatric:         Mood and Affect: Mood normal.         Behavior: Behavior normal.       Significant Labs: All pertinent labs within the past 24 hours have been reviewed.    Significant Imaging: I have reviewed all pertinent imaging results/findings within the past 24 hours.

## 2022-01-09 NOTE — ASSESSMENT & PLAN NOTE
Abdominal US- Cholelithiasis. No convincing evidence of cholecystitis. Stable left lobe of the liver lesion as compared to prior ultrasound and CT suspicious for neoplasm   CT-Cholelithiasis.  Question is made of strandy changes in the pericholecystic fat raising question of cholecystitis.  No surgical intervention at this point. Patient is cleared for discharge from Surgery stand point.   She will follow up as an outpatient regarding her pancreatic cancer.

## 2022-01-09 NOTE — ASSESSMENT & PLAN NOTE
Chronic, controlled.  Latest blood pressure and vitals reviewed-   Temp:  [96.5 °F (35.8 °C)-99.9 °F (37.7 °C)]   Pulse:  [56-73]   Resp:  [14-18]   BP: (113-141)/(56-64)   SpO2:  [93 %-98 %] .   Home meds for hypertension were reviewed and noted below.   Hypertension Medications             lisinopriL 10 MG tablet Take 10 mg by mouth once daily.    metoprolol succinate (TOPROL-XL) 50 MG 24 hr tablet Take 50 mg by mouth once daily.        Continue home medications on discharge.

## 2022-01-09 NOTE — PLAN OF CARE
01/09/22 0920   LOCKWOOD Message   Medicare Outpatient and Observation Notification regarding financial responsibility Explained to patient/caregiver;Signed/date by patient/caregiver   Date LOCKWOOD was signed 01/08/22   Time LOCKWOOD was signed 1967

## 2022-01-09 NOTE — ASSESSMENT & PLAN NOTE
Patient has chronic hypothyroidism. TFTs reviewed-   Lab Results   Component Value Date    TSH 3.575 01/09/2022   Will continue chronic levothyroxine and adjust for and clinical changes.  Repeat TSH ordered.

## 2022-01-09 NOTE — PLAN OF CARE
Patient is clear for discharge from case management standpoint.         01/09/22 1237   Final Note   Assessment Type Final Discharge Note   Anticipated Discharge Disposition Home   What phone number can be called within the next 1-3 days to see how you are doing after discharge? [phone]   Hospital Resources/Appts/Education Provided Appointments scheduled by Navigator/Coordinator   Post-Acute Status   Discharge Delays None known at this time

## 2022-01-09 NOTE — DISCHARGE SUMMARY
[facility] Medicine  Discharge Summary      Patient Name: Asha Lam  MRN: [id number]  Patient Class: OP- Observation  Admission Date: 1/7/2022  Hospital Length of Stay: 0 days  Discharge Date and Time:  01/09/2022 1:16 PM  Attending Physician: Abimael Sanchez MD   Discharging Provider: Abimael Sanchez MD  Primary Care Provider: Ruben Brown MD      HPI:   Asha Lam is a 77 y/o female with PMHx of HTN, DM type II, Hypothyroidism,  and hyperlipidemia who presents with diarrhea and RUQ pain since this morning. Patient has had one episode of diarrhea this morning. Patient recently diagnosed with a neuroendocrine neoplasm of pancreas and is seen at [facility]. Patient was brought to ED by her daughter for further evaluation of RUQ pain. Daughter reports low grade fever to 99.8F that resolved with Tylenol.  Abdominal CT showed cholelithiasis. Question is made of strandy changes in the pericholecystic fat raising question of cholecystitis and Abdominal US cholelithiasis. No convincing evidence of cholecystitis. Stable left lobe of the liver lesion as compared to prior ultrasound and CT suspicious for neoplasm. Lab work revealed leukocytosis to 16.9.  ED provider spoke to Dr. Guo and patient started on IV Zosyn. Hospital medicine consulted and patient was seen in ED with her daughter, Felicity, at bedside.  Patient complaining of RUQ pain that increases with inspiration. Patient will be admitted to hospital medicine for further evaluation of her cholelithiasis.       * No surgery found *      Hospital Course:   Patient was evaluated by surgery upon admission, no indication for surgical intervention. Her pain was chronic, and well controlled with Tylenol at home. She did not have any nausea, vomiting, or diarrhea while in the hospital and she tolerated PO diet.   She was discharged home in a stable condition to follow up as an outpatinet.       Goals of Care Treatment  Preferences:  Code Status: Full Code      Consults:   Consults (From admission, onward)        Status Ordering Provider     Inpatient consult to General Surgery  Once        Provider:  Fercho Guo MD    Completed JOSE MIGUEL BURNETTE          * Cholelithiasis  Abdominal US- Cholelithiasis. No convincing evidence of cholecystitis. Stable left lobe of the liver lesion as compared to prior ultrasound and CT suspicious for neoplasm   CT-Cholelithiasis.  Question is made of strandy changes in the pericholecystic fat raising question of cholecystitis.  No surgical intervention at this point. Patient is cleared for discharge from Surgery stand point.   She will follow up as an outpatient regarding her pancreatic cancer.    Primary malignant neuroendocrine neoplasm of pancreas  Recently diagnosed with a neuroendocrine neoplasm of pancreas & receives care at [facility].    Plan:  Continue to monitor for acute changes  PRN pain control    Hypertension associated with diabetes  Chronic, controlled.  Latest blood pressure and vitals reviewed-   Temp:  [96.5 °F (35.8 °C)-99.9 °F (37.7 °C)]   Pulse:  [56-73]   Resp:  [14-18]   BP: (113-141)/(56-64)   SpO2:  [93 %-98 %] .   Home meds for hypertension were reviewed and noted below.   Hypertension Medications             lisinopriL 10 MG tablet Take 10 mg by mouth once daily.    metoprolol succinate (TOPROL-XL) 50 MG 24 hr tablet Take 50 mg by mouth once daily.        Continue home medications on discharge.     Hyperlipidemia   Patient is chronically on statin.will continue for now. Monitor clinically. Last LDL was   Lab Results   Component Value Date    LDLCALC 39.6 (L) 10/08/2021       Type 2 diabetes mellitus, with long-term current use of insulin  Patient's FSGs are uncontrolled due to hyperglycemia on current medication regimen.  Last A1c reviewed-   Lab Results   Component Value Date    HGBA1C 13.3 (H) 10/08/2021     Most recent fingerstick glucose reviewed-   Recent  Labs   Lab 01/08/22  2110 01/09/22  0514 01/09/22  1209   POCTGLUCOSE 177* 95 130*     Current correctional scale  Medium  Maintain anti-hyperglycemic dose as follows-   Antihyperglycemics (From admission, onward)            Start     Stop Route Frequency Ordered    01/08/22 0100  insulin aspart U-100 pen 1-10 Units         -- SubQ Before meals & nightly PRN 01/08/22 0005      Resume home medications.     Hypothyroidism  Patient has chronic hypothyroidism. TFTs reviewed-   Lab Results   Component Value Date    TSH 3.575 01/09/2022   Will continue chronic levothyroxine and adjust for and clinical changes. Repeat TSH normal. Resume 88 mcg daily.     Final Active Diagnoses:    Diagnosis Date Noted POA    PRINCIPAL PROBLEM:  Cholelithiasis [K80.20] 01/07/2022 Yes    Hypothyroidism [E03.9] 01/07/2022 Yes    Type 2 diabetes mellitus, with long-term current use of insulin [E11.9, Z79.4] 01/07/2022 Not Applicable    Hypertension associated with diabetes [E11.59, I15.2] 01/07/2022 Yes    Hyperlipidemia [E78.5] 01/07/2022 Yes    Primary malignant neuroendocrine neoplasm of pancreas [C7A.8] 12/20/2021 Yes      Problems Resolved During this Admission:       Discharged Condition: good    Disposition: Home or Self Care    Follow Up:   Follow-up Information     Ruben Brown MD. Schedule an appointment as soon as possible for a visit in 1 week.    Specialty: Internal Medicine  Why: Hospital Follow Up  Contact information:  [address]  [address] [facility] [address] [phone]                       Patient Instructions:   No discharge procedures on file.    Significant Diagnostic Studies: Labs:   BMP:   Recent Labs   Lab 01/07/22 2050   *      K 3.9      CO2 21*   BUN 13   CREATININE 1.0   CALCIUM 10.4       Pending Diagnostic Studies:     None         Medications:  Reconciled Home Medications:      Medication List      CHANGE how you take these medications    metoprolol succinate 25 MG 24 hr  tablet  Commonly known as: TOPROL-XL  Take 1 tablet (25 mg total) by mouth once daily.  What changed:   · medication strength  · how much to take        CONTINUE taking these medications    aspirin 81 MG EC tablet  Commonly known as: ECOTRIN  Take 81 mg by mouth once daily.     atorvastatin 20 MG tablet  Commonly known as: LIPITOR  Take 20 mg by mouth once daily.     levothyroxine 88 MCG tablet  Commonly known as: SYNTHROID  Take 1 tablet (88 mcg total) by mouth before breakfast.     lisinopriL 10 MG tablet  Take 10 mg by mouth once daily.     metFORMIN 500 MG tablet  Commonly known as: GLUCOPHAGE  Take 500 mg by mouth 2 (two) times daily with meals.     pantoprazole 40 MG tablet  Commonly known as: PROTONIX  Take 40 mg by mouth once daily.     TOUJEO SOLOSTAR U-300 INSULIN 300 unit/mL (1.5 mL) Inpn pen  Generic drug: insulin glargine (TOUJEO)  SMARTSIG:10 Unit(s) SUB-Q Daily     vitamin E 400 UNIT capsule  Take 400 Units by mouth once daily.        STOP taking these medications    SENSIPAR 30 MG Tab  Generic drug: cinacalcet            Indwelling Lines/Drains at time of discharge:   Lines/Drains/Airways     None                 Time spent on the discharge of patient: 30 minutes    Abimael Sanchez MD  Department of Hospital Medicine  [facility]

## 2022-01-09 NOTE — ASSESSMENT & PLAN NOTE
Recently diagnosed with a neuroendocrine neoplasm of pancreas & receives care at [facility].    Plan:  Continue to monitor for acute changes  PRN pain control

## 2022-01-09 NOTE — ASSESSMENT & PLAN NOTE
Patient's FSGs are uncontrolled due to hyperglycemia on current medication regimen.  Last A1c reviewed-   Lab Results   Component Value Date    HGBA1C 13.3 (H) 10/08/2021     Most recent fingerstick glucose reviewed-   Recent Labs   Lab 01/08/22  2110 01/09/22  0514 01/09/22  1209   POCTGLUCOSE 177* 95 130*     Current correctional scale  Medium  Maintain anti-hyperglycemic dose as follows-   Antihyperglycemics (From admission, onward)            Start     Stop Route Frequency Ordered    01/08/22 0100  insulin aspart U-100 pen 1-10 Units         -- SubQ Before meals & nightly PRN 01/08/22 0005      Resume home medications.

## 2022-01-09 NOTE — PLAN OF CARE
Problem: Adult Inpatient Plan of Care  Goal: Plan of Care Review  Outcome: Ongoing, Progressing     Problem: Infection  Goal: Absence of Infection Signs and Symptoms  Outcome: Ongoing, Progressing     Problem: Pain Acute  Goal: Acceptable Pain Control and Functional Ability  Outcome: Ongoing, Progressing     Problem: Diabetes Comorbidity  Goal: Blood Glucose Level Within Targeted Range  Outcome: Ongoing, Progressing     Up to bathroom with steady gait noted.  Medicated x 1 for complaint of abdominal pain with relief noted.  Blood glucose monitoring done.  Afebrile.

## 2022-01-09 NOTE — PROGRESS NOTES
[facility] Hospital Medicine  Progress Note    Patient Name: Asha Lam  MRN: [id number]  Patient Class: OP- Observation   Admission Date: 1/7/2022  Length of Stay: 0 days  Attending Physician: Abimael Sanchez MD  Primary Care Provider: Ruben Brown MD    Subjective:     Principal Problem:Cholelithiasis    HPI:  Asha Lam is a 75 y/o female with PMHx of HTN, DM type II, Hypothyroidism,  and hyperlipidemia who presents with diarrhea and RUQ pain since this morning. Patient has had one episode of diarrhea this morning. Patient recently diagnosed with a neuroendocrine neoplasm of pancreas and is seen at [facility]. Patient was brought to ED by her daughter for further evaluation of RUQ pain. Daughter reports low grade fever to 99.8F that resolved with Tylenol.  Abdominal CT showed cholelithiasis. Question is made of strandy changes in the pericholecystic fat raising question of cholecystitis and Abdominal US cholelithiasis. No convincing evidence of cholecystitis. Stable left lobe of the liver lesion as compared to prior ultrasound and CT suspicious for neoplasm. Lab work revealed leukocytosis to 16.9.  ED provider spoke to Dr. Guo and patient started on IV Zosyn. Hospital medicine consulted and patient was seen in ED with her daughter, Felicity, at bedside.  Patient complaining of RUQ pain that increases with inspiration. Patient will be admitted to hospital medicine for further evaluation of her cholelithiasis.       Overview/Hospital Course:  Patient was evaluated by surgery upon admission, no indication for surgical intervention. Her pain was chronic, and well controlled with Tylenol at home. She did not have any nausea, vomiting, or diarrhea while in the hospital and she tolerated PO diet.   She was discharged home in a stable condition to follow up as an outpatinet.      Interval History: Patient with no acute events overnight. Surgery evaluated the patient and recommended no  intervention at this time. Patient stable for discharge.    Review of Systems   Constitutional: Positive for appetite change. Negative for activity change, chills and diaphoresis.   HENT: Negative for congestion, dental problem and drooling.    Eyes: Negative for pain, discharge and itching.   Respiratory: Negative for apnea, choking and chest tightness.    Cardiovascular: Negative for chest pain, palpitations and leg swelling.   Gastrointestinal: Positive for abdominal pain and diarrhea. Negative for abdominal distention and blood in stool.   Endocrine: Negative for cold intolerance, heat intolerance, polydipsia and polyphagia.   Genitourinary: Negative for difficulty urinating, dyspareunia and dysuria.   Musculoskeletal: Negative for arthralgias, back pain and gait problem.   All other systems reviewed and are negative.    Objective:     Vital Signs (Most Recent):  Temp: 97.9 °F (36.6 °C) (01/09/22 1159)  Pulse: (!) 56 (01/09/22 1159)  Resp: 17 (01/09/22 1159)  BP: (!) 122/58 (01/09/22 1159)  SpO2: 98 % (01/09/22 1159) Vital Signs (24h Range):  Temp:  [96.5 °F (35.8 °C)-99.9 °F (37.7 °C)] 97.9 °F (36.6 °C)  Pulse:  [56-73] 56  Resp:  [14-18] 17  SpO2:  [93 %-98 %] 98 %  BP: (113-141)/(56-64) 122/58     Weight: 58 kg (127 lb 13.9 oz)  Body mass index is 22.65 kg/m².    Intake/Output Summary (Last 24 hours) at 1/9/2022 1307  Last data filed at 1/9/2022 1200  Gross per 24 hour   Intake 646.96 ml   Output 600 ml   Net 46.96 ml      Physical Exam  Vitals and nursing note reviewed.   Constitutional:       Appearance: Normal appearance. She is normal weight.   HENT:      Head: Normocephalic and atraumatic.      Nose: Nose normal.      Mouth/Throat:      Mouth: Mucous membranes are moist.   Eyes:      Extraocular Movements: Extraocular movements intact.      Conjunctiva/sclera: Conjunctivae normal.      Pupils: Pupils are equal, round, and reactive to light.   Cardiovascular:      Rate and Rhythm: Normal rate and regular  rhythm.      Pulses: Normal pulses.      Heart sounds: Normal heart sounds.   Pulmonary:      Effort: Pulmonary effort is normal.      Breath sounds: Normal breath sounds.   Abdominal:      General: Abdomen is flat. Bowel sounds are normal.      Palpations: Abdomen is soft.   Musculoskeletal:         General: Normal range of motion.   Skin:     General: Skin is warm and dry.   Neurological:      General: No focal deficit present.      Mental Status: She is alert and oriented to person, place, and time. Mental status is at baseline.   Psychiatric:         Mood and Affect: Mood normal.         Behavior: Behavior normal.       Significant Labs: All pertinent labs within the past 24 hours have been reviewed.    Significant Imaging: I have reviewed all pertinent imaging results/findings within the past 24 hours.      Assessment/Plan:      * Cholelithiasis  Abdominal US- Cholelithiasis. No convincing evidence of cholecystitis. Stable left lobe of the liver lesion as compared to prior ultrasound and CT suspicious for neoplasm   CT-Cholelithiasis.  Question is made of strandy changes in the pericholecystic fat raising question of cholecystitis.  No surgical intervention at this point. Patient is cleared for discharge from Surgery stand point.   She will follow up as an outpatient regarding her pancreatic cancer.    Primary malignant neuroendocrine neoplasm of pancreas  Recently diagnosed with a neuroendocrine neoplasm of pancreas & receives care at [facility].    Plan:  Continue to monitor for acute changes  PRN pain control    Hypertension associated with diabetes  Chronic, controlled.  Latest blood pressure and vitals reviewed-   Temp:  [96.5 °F (35.8 °C)-99.9 °F (37.7 °C)]   Pulse:  [56-73]   Resp:  [14-18]   BP: (113-141)/(56-64)   SpO2:  [93 %-98 %] .   Home meds for hypertension were reviewed and noted below.   Hypertension Medications             lisinopriL 10 MG tablet Take 10 mg by mouth once daily.     metoprolol succinate (TOPROL-XL) 50 MG 24 hr tablet Take 50 mg by mouth once daily.        Continue home medications on discharge.     Hyperlipidemia   Patient is chronically on statin.will continue for now. Monitor clinically. Last LDL was   Lab Results   Component Value Date    LDLCALC 39.6 (L) 10/08/2021       Type 2 diabetes mellitus, with long-term current use of insulin  Patient's FSGs are uncontrolled due to hyperglycemia on current medication regimen.  Last A1c reviewed-   Lab Results   Component Value Date    HGBA1C 13.3 (H) 10/08/2021     Most recent fingerstick glucose reviewed-   Recent Labs   Lab 01/08/22  2110 01/09/22  0514 01/09/22  1209   POCTGLUCOSE 177* 95 130*     Current correctional scale  Medium  Maintain anti-hyperglycemic dose as follows-   Antihyperglycemics (From admission, onward)            Start     Stop Route Frequency Ordered    01/08/22 0100  insulin aspart U-100 pen 1-10 Units         -- SubQ Before meals & nightly PRN 01/08/22 0005      Resume home medications.     Hypothyroidism  Patient has chronic hypothyroidism. TFTs reviewed-   Lab Results   Component Value Date    TSH 3.575 01/09/2022   Will continue chronic levothyroxine and adjust for and clinical changes.  Repeat TSH ordered.       VTE Risk Mitigation (From admission, onward)         Ordered     Reason for No Pharmacological VTE Prophylaxis  Once        Question:  Reasons:  Answer:  Risk of Bleeding    01/08/22 0005     IP VTE HIGH RISK PATIENT  Once         01/08/22 0004     Place sequential compression device  Until discontinued         01/08/22 0004              Discharge Planning   LORRIE: 1/9/2022     Code Status: Full Code   Is the patient medically ready for discharge?:     Reason for patient still in hospital (select all that apply): Other (specify) discharging today.   Discharge Plan A: Home with family   Discharge Delays: None known at this time      Abimael Sanchez MD  Department of Hospital Medicine   [facility]

## 2022-01-09 NOTE — PLAN OF CARE
[facility]  Initial Discharge Assessment       Primary Care Provider: Ruben Brown MD    Admission Diagnosis: Cholecystitis [K81.9]  Chest pain [R07.9]    Admission Date: 1/7/2022  Expected Discharge Date:      Discharge Barriers Identified: None    Payor: MEDICARE / Plan: MEDICARE PART A & B / Product Type: Government /     Extended Emergency Contact Information  Primary Emergency Contact: Caleb Mejia  Mobile Phone: [phone]  Relation: Daughter  Preferred language: English   needed? No  Secondary Emergency Contact: Felicity Marmolejo  Mobile Phone: [phone]  Relation: Daughter  Preferred language: English   needed? No    Discharge Plan A: Home with family  Discharge Plan B: Home with family    CM completed assessment with the patient at the bedside, Caleb Mejia (daughter) and Felicity Marmolejo (daughter) also present at the bedside, patient is AAOX4.  CM verified information on face sheet to be correct, verified PCP, insurance, and pharmacy as correct.  Patient denies MPOA/Living Will, home DME, HH/NP, dialysis, and Coumadin use.  Patient endorses compliance with home medications, independence with ADLs, drives,  and lives with spouse.  Upon discharge, , patients daughters) to provide transportation, D/C plan is home with family, CM will follow to assist with discharge needs.      [id number] - [place], [place] - [address] AT [facility] [address]  [address]  [place] [address]  Phone: [phone] Fax: [phone]      Initial Assessment (most recent)       Adult Discharge Assessment - 01/09/22 0919          Discharge Assessment    Assessment Type Discharge Planning Assessment     Confirmed/corrected address, phone number and insurance Yes     Confirmed Demographics Correct on Facesheet     Source of Information patient     Does patient/caregiver understand observation status Yes     Reason For  Admission Cholecystitis     Lives With spouse     Facility Arrived From: Home     Do you expect to return to your current living situation? Yes     Do you have help at home or someone to help you manage your care at home? Yes     Who are your caregiver(s) and their phone number(s)? Caleb Mejia (daughter) [phone] & Felicity Marmolejo (daughter) [phone]     Prior to hospitilization cognitive status: Alert/Oriented     Current cognitive status: Alert/Oriented     Walking or Climbing Stairs Difficulty none     Dressing/Bathing Difficulty none     Equipment Currently Used at Home none     Readmission within 30 days? No     Patient currently being followed by outpatient case management? No     Do you currently have service(s) that help you manage your care at home? No     Do you take prescription medications? Yes     Do you have prescription coverage? Yes     Coverage [employer]     Do you have any problems affording any of your prescribed medications? No     Is the patient taking medications as prescribed? yes     Who is going to help you get home at discharge? Caleb Mejia (daughter) [phone] & Felicity Marmolejo (daughter) [phone]     How do you get to doctors appointments? car, drives self     Are you on dialysis? No     Do you take coumadin? No     Discharge Plan A Home with family     Discharge Plan B Home with family     DME Needed Upon Discharge  --   TBD    Discharge Plan discussed with: Patient     Discharge Barriers Identified None        Relationship/Environment    Name(s) of Who Lives With Patient Spouse

## 2022-01-09 NOTE — HOSPITAL COURSE
Patient was evaluated by surgery upon admission, no indication for surgical intervention. Her pain was chronic, and well controlled with Tylenol at home. She did not have any nausea, vomiting, or diarrhea while in the hospital and she tolerated PO diet.   She was discharged home in a stable condition to follow up as an outpatinet.

## 2022-01-10 ENCOUNTER — TELEPHONE (OUTPATIENT)
Dept: MEDSURG UNIT | Facility: HOSPITAL | Age: 77
End: 2022-01-10
Payer: MEDICARE